# Patient Record
Sex: FEMALE | Race: BLACK OR AFRICAN AMERICAN | Employment: UNEMPLOYED | URBAN - METROPOLITAN AREA
[De-identification: names, ages, dates, MRNs, and addresses within clinical notes are randomized per-mention and may not be internally consistent; named-entity substitution may affect disease eponyms.]

---

## 2017-11-27 ENCOUNTER — HOSPITAL ENCOUNTER (OUTPATIENT)
Dept: LAB | Age: 57
Discharge: HOME | End: 2017-11-27
Payer: MEDICARE

## 2017-11-27 ENCOUNTER — DOCUMENTATION ONLY (OUTPATIENT)
Dept: INTERNAL MEDICINE CLINIC | Age: 57
End: 2017-11-27

## 2017-11-27 ENCOUNTER — OFFICE VISIT (OUTPATIENT)
Dept: INTERNAL MEDICINE CLINIC | Age: 57
End: 2017-11-27

## 2017-11-27 VITALS
HEART RATE: 72 BPM | OXYGEN SATURATION: 97 % | TEMPERATURE: 97.9 F | DIASTOLIC BLOOD PRESSURE: 88 MMHG | RESPIRATION RATE: 20 BRPM | SYSTOLIC BLOOD PRESSURE: 127 MMHG | BODY MASS INDEX: 35.61 KG/M2 | HEIGHT: 63 IN | WEIGHT: 201 LBS

## 2017-11-27 DIAGNOSIS — E11.9 TYPE 2 DIABETES MELLITUS WITHOUT COMPLICATION, WITH LONG-TERM CURRENT USE OF INSULIN (HCC): Primary | ICD-10-CM

## 2017-11-27 DIAGNOSIS — Z12.11 COLON CANCER SCREENING: ICD-10-CM

## 2017-11-27 DIAGNOSIS — I10 ESSENTIAL HYPERTENSION: ICD-10-CM

## 2017-11-27 DIAGNOSIS — Z11.3 SCREEN FOR STD (SEXUALLY TRANSMITTED DISEASE): ICD-10-CM

## 2017-11-27 DIAGNOSIS — E78.5 HYPERLIPIDEMIA, UNSPECIFIED HYPERLIPIDEMIA TYPE: ICD-10-CM

## 2017-11-27 DIAGNOSIS — E11.9 TYPE 2 DIABETES MELLITUS WITHOUT COMPLICATION, WITH LONG-TERM CURRENT USE OF INSULIN (HCC): ICD-10-CM

## 2017-11-27 DIAGNOSIS — Z79.4 TYPE 2 DIABETES MELLITUS WITHOUT COMPLICATION, WITH LONG-TERM CURRENT USE OF INSULIN (HCC): Primary | ICD-10-CM

## 2017-11-27 DIAGNOSIS — Z79.4 TYPE 2 DIABETES MELLITUS WITHOUT COMPLICATION, WITH LONG-TERM CURRENT USE OF INSULIN (HCC): ICD-10-CM

## 2017-11-27 LAB
ALBUMIN SERPL-MCNC: 4 G/DL (ref 3.4–5)
ALBUMIN/GLOB SERPL: 0.9 {RATIO} (ref 0.8–1.7)
ALP SERPL-CCNC: 135 U/L (ref 45–117)
ALT SERPL-CCNC: 28 U/L (ref 13–56)
ANION GAP SERPL CALC-SCNC: 11 MMOL/L (ref 3–18)
AST SERPL-CCNC: 18 U/L (ref 15–37)
BASOPHILS # BLD: 0 K/UL (ref 0–0.06)
BASOPHILS NFR BLD: 0 % (ref 0–2)
BILIRUB SERPL-MCNC: 0.7 MG/DL (ref 0.2–1)
BUN SERPL-MCNC: 20 MG/DL (ref 7–18)
BUN/CREAT SERPL: 26 (ref 12–20)
CALCIUM SERPL-MCNC: 9.4 MG/DL (ref 8.5–10.1)
CHLORIDE SERPL-SCNC: 104 MMOL/L (ref 100–108)
CO2 SERPL-SCNC: 23 MMOL/L (ref 21–32)
CREAT SERPL-MCNC: 0.78 MG/DL (ref 0.6–1.3)
DIFFERENTIAL METHOD BLD: ABNORMAL
EOSINOPHIL # BLD: 0.1 K/UL (ref 0–0.4)
EOSINOPHIL NFR BLD: 1 % (ref 0–5)
ERYTHROCYTE [DISTWIDTH] IN BLOOD BY AUTOMATED COUNT: 15.2 % (ref 11.6–14.5)
EST. AVERAGE GLUCOSE BLD GHB EST-MCNC: 151 MG/DL
GLOBULIN SER CALC-MCNC: 4.3 G/DL (ref 2–4)
GLUCOSE SERPL-MCNC: 94 MG/DL (ref 74–99)
HBA1C MFR BLD: 6.9 % (ref 4.2–5.6)
HCT VFR BLD AUTO: 48.9 % (ref 35–45)
HGB BLD-MCNC: 16.2 G/DL (ref 12–16)
LYMPHOCYTES # BLD: 4 K/UL (ref 0.9–3.6)
LYMPHOCYTES NFR BLD: 48 % (ref 21–52)
MCH RBC QN AUTO: 28.9 PG (ref 24–34)
MCHC RBC AUTO-ENTMCNC: 33.1 G/DL (ref 31–37)
MCV RBC AUTO: 87.2 FL (ref 74–97)
MONOCYTES # BLD: 0.6 K/UL (ref 0.05–1.2)
MONOCYTES NFR BLD: 7 % (ref 3–10)
NEUTS SEG # BLD: 3.7 K/UL (ref 1.8–8)
NEUTS SEG NFR BLD: 44 % (ref 40–73)
PLATELET # BLD AUTO: 468 K/UL (ref 135–420)
PMV BLD AUTO: 11.2 FL (ref 9.2–11.8)
POTASSIUM SERPL-SCNC: 4.2 MMOL/L (ref 3.5–5.5)
PROT SERPL-MCNC: 8.3 G/DL (ref 6.4–8.2)
RBC # BLD AUTO: 5.61 M/UL (ref 4.2–5.3)
RPR SER QL: NONREACTIVE
SODIUM SERPL-SCNC: 138 MMOL/L (ref 136–145)
TSH SERPL DL<=0.05 MIU/L-ACNC: 0.42 UIU/ML (ref 0.36–3.74)
WBC # BLD AUTO: 8.4 K/UL (ref 4.6–13.2)

## 2017-11-27 PROCEDURE — 87389 HIV-1 AG W/HIV-1&-2 AB AG IA: CPT | Performed by: FAMILY MEDICINE

## 2017-11-27 PROCEDURE — 84443 ASSAY THYROID STIM HORMONE: CPT | Performed by: FAMILY MEDICINE

## 2017-11-27 PROCEDURE — 85025 COMPLETE CBC W/AUTO DIFF WBC: CPT | Performed by: FAMILY MEDICINE

## 2017-11-27 PROCEDURE — 36415 COLL VENOUS BLD VENIPUNCTURE: CPT | Performed by: FAMILY MEDICINE

## 2017-11-27 PROCEDURE — 83721 ASSAY OF BLOOD LIPOPROTEIN: CPT | Performed by: FAMILY MEDICINE

## 2017-11-27 PROCEDURE — 87491 CHLMYD TRACH DNA AMP PROBE: CPT | Performed by: FAMILY MEDICINE

## 2017-11-27 PROCEDURE — 83036 HEMOGLOBIN GLYCOSYLATED A1C: CPT | Performed by: FAMILY MEDICINE

## 2017-11-27 PROCEDURE — 86592 SYPHILIS TEST NON-TREP QUAL: CPT | Performed by: FAMILY MEDICINE

## 2017-11-27 PROCEDURE — 80053 COMPREHEN METABOLIC PANEL: CPT | Performed by: FAMILY MEDICINE

## 2017-11-27 RX ORDER — HYDROCHLOROTHIAZIDE 25 MG/1
25 TABLET ORAL DAILY
COMMUNITY
End: 2017-11-27 | Stop reason: SDUPTHER

## 2017-11-27 RX ORDER — INSULIN GLARGINE 100 [IU]/ML
20 INJECTION, SOLUTION SUBCUTANEOUS DAILY
Qty: 1 PEN | Refills: 3 | Status: SHIPPED | OUTPATIENT
Start: 2017-11-27 | End: 2017-11-27 | Stop reason: SDUPTHER

## 2017-11-27 RX ORDER — AMLODIPINE BESYLATE 5 MG/1
5 TABLET ORAL DAILY
Qty: 90 TAB | Refills: 1 | Status: SHIPPED | OUTPATIENT
Start: 2017-11-27 | End: 2017-11-27 | Stop reason: SDUPTHER

## 2017-11-27 RX ORDER — ATORVASTATIN CALCIUM 40 MG/1
40 TABLET, FILM COATED ORAL DAILY
Qty: 90 TAB | Refills: 1 | Status: SHIPPED | OUTPATIENT
Start: 2017-11-27 | End: 2017-11-27 | Stop reason: SDUPTHER

## 2017-11-27 RX ORDER — HYDROCHLOROTHIAZIDE 25 MG/1
25 TABLET ORAL DAILY
Qty: 90 TAB | Refills: 1 | Status: SHIPPED | OUTPATIENT
Start: 2017-11-27 | End: 2017-11-27 | Stop reason: SDUPTHER

## 2017-11-27 RX ORDER — INSULIN GLARGINE 100 [IU]/ML
20 INJECTION, SOLUTION SUBCUTANEOUS DAILY
Qty: 5 PEN | Refills: 3 | Status: SHIPPED | OUTPATIENT
Start: 2017-11-27 | End: 2018-06-18 | Stop reason: SDUPTHER

## 2017-11-27 RX ORDER — ATORVASTATIN CALCIUM 20 MG/1
TABLET, FILM COATED ORAL DAILY
COMMUNITY
End: 2017-11-27 | Stop reason: SDUPTHER

## 2017-11-27 RX ORDER — LANCING DEVICE
EACH MISCELLANEOUS
Qty: 1 EACH | Refills: 0 | Status: SHIPPED | OUTPATIENT
Start: 2017-11-27

## 2017-11-27 RX ORDER — AMLODIPINE BESYLATE 5 MG/1
5 TABLET ORAL DAILY
COMMUNITY
End: 2017-11-27 | Stop reason: SDUPTHER

## 2017-11-27 RX ORDER — AMLODIPINE BESYLATE 5 MG/1
5 TABLET ORAL DAILY
Qty: 90 TAB | Refills: 1 | Status: SHIPPED | OUTPATIENT
Start: 2017-11-27 | End: 2018-06-18 | Stop reason: SDUPTHER

## 2017-11-27 RX ORDER — ATORVASTATIN CALCIUM 40 MG/1
40 TABLET, FILM COATED ORAL DAILY
Qty: 90 TAB | Refills: 1 | Status: SHIPPED | OUTPATIENT
Start: 2017-11-27 | End: 2018-06-18 | Stop reason: SDUPTHER

## 2017-11-27 RX ORDER — HYDROCHLOROTHIAZIDE 25 MG/1
25 TABLET ORAL DAILY
Qty: 90 TAB | Refills: 1 | Status: SHIPPED | OUTPATIENT
Start: 2017-11-27 | End: 2018-06-18 | Stop reason: SDUPTHER

## 2017-11-27 RX ORDER — ASPIRIN 325 MG
325 TABLET ORAL DAILY
Qty: 90 TAB | Refills: 3 | Status: SHIPPED | OUTPATIENT
Start: 2017-11-27 | End: 2017-12-21

## 2017-11-27 RX ORDER — ASPIRIN 325 MG
325 TABLET ORAL DAILY
COMMUNITY
End: 2017-11-27 | Stop reason: SDUPTHER

## 2017-11-27 RX ORDER — LANCING DEVICE
EACH MISCELLANEOUS
Qty: 1 EACH | Refills: 0 | Status: SHIPPED | OUTPATIENT
Start: 2017-11-27 | End: 2017-11-27 | Stop reason: SDUPTHER

## 2017-11-27 RX ORDER — ASPIRIN 325 MG
325 TABLET ORAL DAILY
Qty: 90 TAB | Refills: 3 | Status: SHIPPED | OUTPATIENT
Start: 2017-11-27 | End: 2017-11-27 | Stop reason: SDUPTHER

## 2017-11-27 NOTE — PROGRESS NOTES
Pt was seen in office today. Pt states she forgot to mention to Dr Betzaida Boston that she wants a Colonoscopy. I advised pt that I would send her request to provider.  Pt verbalized understanding and had no further questions

## 2017-11-27 NOTE — TELEPHONE ENCOUNTER
Patient would like prescriptions ordered on 11/27/17 transferred to [facility] on [address] [facility].

## 2017-11-27 NOTE — TELEPHONE ENCOUNTER
The pharmacist with [address] at on [address] [facility] called verified with two Identifiers he stated the way the insulin pen is written wont last patient for 30 days he stated 1 box would last her a 30 day supply. Verbal order to dispense 1 box with 5 pens for patient. Advised that patient really wants to pick it up at the [address] [place] [address] pharmacy. He said she can go there and have them fill it for her since they are all connected without us sending it all again up to us how we do it. Advised would take care of it.

## 2017-11-27 NOTE — PROGRESS NOTES
FAMILY MEDICINE CLINIC NOTE    S: The presents for establishment of care. The patient has a history of DM2, HTN, HLD and COPD. She is due for a colonoscopy. She is taking insulin 20 units daily for her diabetes, needs a refill of this and test strips    She would like an STD screen    Denies any recent angina, dyspnea, edema, palpitations, temperature intolerance or malaise. No current outpatient prescriptions on file prior to visit. No current facility-administered medications on file prior to visit. Past Medical History:   Diagnosis Date    Arthritis     rheumatoid    Diabetes ([facility]     Emphysema lung ([facility]     Hypertension        Social History     Social History    Marital status: LEGALLY      Spouse name: N/A    Number of children: N/A    Years of education: N/A     Occupational History    Not on file. Social History Main Topics    Smoking status: Current Every Day Smoker    Smokeless tobacco: Never Used    Alcohol use Yes    Drug use: No    Sexual activity: Yes     Partners: Male     Other Topics Concern    Not on file     Social History Narrative    No narrative on file       No family history on file. O:  Visit Vitals    /88 (BP 1 Location: Left arm, BP Patient Position: Sitting)    Pulse 72    Temp 97.9 °F (36.6 °C) (Oral)    Resp 20    Ht 5' 3\" (1.6 m)    Wt 201 lb (91.2 kg)    SpO2 97%    BMI 35.61 kg/m2     NAD, comfortable  No thyromegaly  RRR, no murmurs  CTABL, no wheezing/ronchi/rales  abd soft ND NT normoactive bs  No edema    56 y.o. female      ICD-10-CM ICD-9-CM    1.  Type 2 diabetes mellitus without complication, with long-term current use of insulin ([facility]) E11.9 250.00 TSH 3RD GENERATION    U98.1 Z42.36 METABOLIC PANEL, COMPREHENSIVE      HEMOGLOBIN A1C WITH EAG      DISCONTINUED: insulin glargine (LANTUS,BASAGLAR) 100 unit/mL (3 mL) inpn      DISCONTINUED: glucose blood VI test strips (FREESTYLE TEST) strip      DISCONTINUED: Lancing Device (AUTO-LANCET MINI) misc      DISCONTINUED: amLODIPine (NORVASC) 5 mg tablet      DISCONTINUED: aspirin (ASPIRIN) 325 mg tablet      DISCONTINUED: hydroCHLOROthiazide (HYDRODIURIL) 25 mg tablet   2. Essential hypertension I10 401.9 TSH 3RD GENERATION      CBC WITH AUTOMATED DIFF      METABOLIC PANEL, COMPREHENSIVE   3. Hyperlipidemia, unspecified hyperlipidemia type E78.5 272.4 TSH 3RD GENERATION      LDL, DIRECT      METABOLIC PANEL, COMPREHENSIVE      DISCONTINUED: atorvastatin (LIPITOR) 40 mg tablet      DISCONTINUED: aspirin (ASPIRIN) 325 mg tablet   4. Screen for STD (sexually transmitted disease) Z11.3 V74.5 HIV 1/2 AG/AB, 4TH GENERATION,W RFLX CONFIRM      RPR      CHLAMYDIA/GC PCR   5.  Colon cancer screening Z12.11 V76.51 REFERRAL TO GASTROENTEROLOGY

## 2017-11-27 NOTE — MR AVS SNAPSHOT
Visit Information Date & Time Provider Department Dept. Phone Encounter #  
 11/27/2017 12:00 PM Sury [facility] [phone] [id number] Upcoming Health Maintenance Date Due Hepatitis C Screening 1960 DTaP/Tdap/Td series (1 - Tdap) 7/2/1983 PAP AKA CERVICAL CYTOLOGY 7/2/1983 BREAST CANCER SCRN MAMMOGRAM 7/2/2012 FOBT Q 1 YEAR AGE 50-75 7/2/2012 Influenza Age 5 to Adult 8/1/2017 Allergies as of 11/27/2017  Review Complete On: 11/27/2017 By: Sury Mojica MD  
  
 Severity Noted Reaction Type Reactions Ace Inhibitors  11/27/2017    Other (comments) Throat closure Current Immunizations  Never Reviewed No immunizations on file. Not reviewed this visit You Were Diagnosed With   
  
 Codes Comments Type 2 diabetes mellitus without complication, with long-term current use of insulin (HCC)    -  Primary ICD-10-CM: E11.9, Z79.4 ICD-9-CM: 250.00, V58.67 Essential hypertension     ICD-10-CM: I10 
ICD-9-CM: 401.9 Hyperlipidemia, unspecified hyperlipidemia type     ICD-10-CM: E78.5 ICD-9-CM: 272.4 Screen for STD (sexually transmitted disease)     ICD-10-CM: Z11.3 ICD-9-CM: V74.5 Vitals BP Pulse Temp Resp Height(growth percentile) Weight(growth percentile) 127/88 (BP 1 Location: Left arm, BP Patient Position: Sitting) 72 97.9 °F (36.6 °C) (Oral) 20 5' 3\" (1.6 m) 201 lb (91.2 kg) SpO2 BMI OB Status Smoking Status 97% 35.61 kg/m2 Hysterectomy Current Every Day Smoker Vitals History BMI and BSA Data Body Mass Index Body Surface Area  
 35.61 kg/m 2 2.01 m 2 Preferred Pharmacy Pharmacy Name Phone [facility] [address]  [facility] [phone] Your Updated Medication List  
  
   
This list is accurate as of: 11/27/17 12:26 PM.  Always use your most recent med list. amLODIPine 5 mg tablet Commonly known as:  Hernesto Chafe Take 1 Tab by mouth daily. aspirin 325 mg tablet Commonly known as:  aspirin Take 1 Tab by mouth daily. atorvastatin 40 mg tablet Commonly known as:  LIPITOR Take 1 Tab by mouth daily. glucose blood VI test strips strip Commonly known as:  FREESTYLE TEST Use to test blood sugar 1-2 times per day  
  
 hydroCHLOROthiazide 25 mg tablet Commonly known as:  HYDRODIURIL Take 1 Tab by mouth daily. insulin glargine 100 unit/mL (3 mL) Inpn Commonly known as:  LANTUS,BASAGLAR  
20 Units by SubCUTAneous route daily. Lancing Device Misc Commonly known as:  AUTO-LANCET MINI Use to hold lancets Prescriptions Sent to Pharmacy Refills  
 insulin glargine (LANTUS,BASAGLAR) 100 unit/mL (3 mL) inpn 3 Si Units by SubCUTAneous route daily. Class: Normal  
 Pharmacy: [address] Ph #: [phone] Route: SubCUTAneous  
 glucose blood VI test strips (FREESTYLE TEST) strip 3 Sig: Use to test blood sugar 1-2 times per day Class: Normal  
 Pharmacy: [address] Ph #: [phone] Lancing Device (AUTO-LANCET MINI) misc 0 Sig: Use to hold lancets Class: Normal  
 Pharmacy: [address] [place] [address] Ph #: [phone]  
 atorvastatin (LIPITOR) 40 mg tablet 1 Sig: Take 1 Tab by mouth daily. Class: Normal  
 Pharmacy: [address] Ph #: [phone] Route: Oral  
 amLODIPine (NORVASC) 5 mg tablet 1 Sig: Take 1 Tab by mouth daily. Class: Normal  
 Pharmacy: [address] Ph #: [phone] Route: Oral  
 aspirin (ASPIRIN) 325 mg tablet 3 Sig: Take 1 Tab by mouth daily.   
 Class: Normal  
 Pharmacy: [facility] Ph #: [phone] Route: Oral  
 hydroCHLOROthiazide (HYDRODIURIL) 25 mg tablet 1 Sig: Take 1 Tab by mouth daily. Class: Normal  
 Pharmacy: [facility] Ph #: [phone] Route: Oral  
  
To-Do List   
 11/27/2017 Lab:  CBC WITH AUTOMATED DIFF   
  
 11/27/2017 Lab:  CHLAMYDIA/GC PCR   
  
 11/27/2017 Lab:  HEMOGLOBIN A1C WITH EAG   
  
 11/27/2017 Lab:  HIV 1/2 AG/AB, 4TH GENERATION,W RFLX CONFIRM   
  
 11/27/2017 Lab:  LDL, DIRECT   
  
 11/27/2017 Lab:  METABOLIC PANEL, COMPREHENSIVE   
  
 11/27/2017 Lab:  RPR   
  
 11/27/2017 Lab:  TSH 3RD GENERATION Introducing [facility] SERVICES! Adeladie Low introduces [employer] patient portal. Now you can access parts of your medical record, email your doctor's office, and request medication refills online. 1. In your internet browser, go to [email or web address] 2. Click on the First Time User? Click Here link in the Sign In box. You will see the New Member Sign Up page. 3. Enter your ShopEat Access Code exactly as it appears below. You will not need to use this code after youve completed the sign-up process. If you do not sign up before the expiration date, you must request a new code. · [employer]Eat Access Code: [email or web address] Expires: 2/25/2018 12:07 PM 
 
4. Enter the last four digits of your Social Security Number (xxxx) and Date of Birth (mm/dd/yyyy) as indicated and click Submit. You will be taken to the next sign-up page. 5. Create a 24Symbolst ID. This will be your ShopEat login ID and cannot be changed, so think of one that is secure and easy to remember. 6. Create a ShopEat password. You can change your password at any time. 7. Enter your Password Reset Question and Answer. This can be used at a later time if you forget your password. 8. Enter your e-mail address. You will receive e-mail notification when new information is available in [address] 9. Click Sign Up. You can now view and download portions of your medical record. 10. Click the Download Summary menu link to download a portable copy of your medical information. If you have questions, please visit the Frequently Asked Questions section of the [employer] website. Remember, [employer] is NOT to be used for urgent needs. For medical emergencies, dial 911. Now available from your iPhone and Android! Please provide this summary of care documentation to your next provider. Your primary care clinician is listed as NOT ON FILE. If you have any questions after today's visit, please call [phone].

## 2017-11-28 LAB
HIV 1+2 AB+HIV1 P24 AG SERPL QL IA: NONREACTIVE
HIV12 RESULT COMMENT, HHIVC: NORMAL
LDLC SERPL DIRECT ASSAY-MCNC: 68 MG/DL (ref 0–99)

## 2017-11-29 LAB
C TRACH RRNA SPEC QL NAA+PROBE: NEGATIVE
N GONORRHOEA RRNA SPEC QL NAA+PROBE: NEGATIVE
SPECIMEN SOURCE: NORMAL

## 2017-12-06 ENCOUNTER — TELEPHONE (OUTPATIENT)
Dept: INTERNAL MEDICINE CLINIC | Age: 57
End: 2017-12-06

## 2017-12-06 NOTE — LETTER
12/21/2017 Ms. Manuela Ruiz [id number] [facility] [address] [id number] Re: Labs Dear Aditya Post: 
 
I hope this letter finds you well. I am a Licensed Practical Nurse with [employer]. I have attempted to contact you by phone, but was unsuccessful. Your good health is important to us, that is why we are sending you this friendly letter. Please contact our office regarding labs drawn/resulted on November 27, 2017. As always, our goal is to be your partner in life-long wellness. We appreciate the opportunity to serve you! Sincerely, Violette Dupree.[email or web address]

## 2017-12-13 NOTE — TELEPHONE ENCOUNTER
Please let pt know that labs were normal except:    1) BUN, \"kidney function,\" little up at 20. Probably due to dehydration from fasting. Keep hydrated with water. Will monitor. 2) HgA1C is good at 6.9.    3) alk phos up at 135 and total protein up. Is she having any RUQ pain, yellowing of eyes or skin, n/v/?    4) H/H up at 16.2/48. 9: probably up from dehydration and also smoking. Increase water and stop smoking. 5) Negative for GC/chlamydia, HIV, syphilis.

## 2017-12-19 NOTE — TELEPHONE ENCOUNTER
Attempted to contact pt at  number, no answer. Lvm for pt to return call to office at [phone]. Will continue to try to contact pt.

## 2017-12-21 ENCOUNTER — TELEPHONE (OUTPATIENT)
Dept: INTERNAL MEDICINE CLINIC | Age: 57
End: 2017-12-21

## 2017-12-21 ENCOUNTER — OFFICE VISIT (OUTPATIENT)
Dept: INTERNAL MEDICINE CLINIC | Age: 57
End: 2017-12-21

## 2017-12-21 VITALS
OXYGEN SATURATION: 100 % | BODY MASS INDEX: 35.93 KG/M2 | HEIGHT: 63 IN | WEIGHT: 202.8 LBS | TEMPERATURE: 97.3 F | SYSTOLIC BLOOD PRESSURE: 107 MMHG | DIASTOLIC BLOOD PRESSURE: 79 MMHG | HEART RATE: 67 BPM | RESPIRATION RATE: 18 BRPM

## 2017-12-21 DIAGNOSIS — Z11.59 NEED FOR HEPATITIS C SCREENING TEST: ICD-10-CM

## 2017-12-21 DIAGNOSIS — R09.82 PND (POST-NASAL DRIP): ICD-10-CM

## 2017-12-21 DIAGNOSIS — E11.9 TYPE 2 DIABETES MELLITUS WITHOUT COMPLICATION, WITH LONG-TERM CURRENT USE OF INSULIN (HCC): ICD-10-CM

## 2017-12-21 DIAGNOSIS — Z12.11 COLON CANCER SCREENING: ICD-10-CM

## 2017-12-21 DIAGNOSIS — G45.9 TRANSIENT CEREBRAL ISCHEMIA, UNSPECIFIED TYPE: ICD-10-CM

## 2017-12-21 DIAGNOSIS — H10.9 BACTERIAL CONJUNCTIVITIS: ICD-10-CM

## 2017-12-21 DIAGNOSIS — R05.9 COUGH: Primary | ICD-10-CM

## 2017-12-21 DIAGNOSIS — Z79.4 TYPE 2 DIABETES MELLITUS WITHOUT COMPLICATION, WITH LONG-TERM CURRENT USE OF INSULIN (HCC): ICD-10-CM

## 2017-12-21 RX ORDER — ALBUTEROL SULFATE 90 UG/1
2 AEROSOL, METERED RESPIRATORY (INHALATION)
Qty: 1 INHALER | Refills: 3 | Status: SHIPPED | OUTPATIENT
Start: 2017-12-21

## 2017-12-21 RX ORDER — ERYTHROMYCIN 5 MG/G
OINTMENT OPHTHALMIC
Qty: 1 G | Refills: 0 | Status: SHIPPED | OUTPATIENT
Start: 2017-12-21 | End: 2019-02-18 | Stop reason: ALTCHOICE

## 2017-12-21 RX ORDER — CLOPIDOGREL BISULFATE 75 MG/1
75 TABLET ORAL DAILY
Qty: 90 TAB | Refills: 1 | Status: SHIPPED | OUTPATIENT
Start: 2017-12-21 | End: 2018-06-18 | Stop reason: SDUPTHER

## 2017-12-21 RX ORDER — BUPROPION HYDROCHLORIDE 150 MG/1
150 TABLET, EXTENDED RELEASE ORAL
COMMUNITY
Start: 2016-08-30

## 2017-12-21 RX ORDER — NYSTATIN 100000 [USP'U]/ML
SUSPENSION ORAL
COMMUNITY
Start: 2017-11-13

## 2017-12-21 RX ORDER — ESOMEPRAZOLE MAGNESIUM 40 MG/1
40 CAPSULE, DELAYED RELEASE ORAL
COMMUNITY
Start: 2016-07-25

## 2017-12-21 RX ORDER — PSEUDOEPHEDRINE HYDROCHLORIDE 60 MG/1
60 TABLET ORAL
Qty: 40 TAB | Refills: 0 | Status: SHIPPED | OUTPATIENT
Start: 2017-12-21

## 2017-12-21 RX ORDER — ALBUTEROL SULFATE 90 UG/1
AEROSOL, METERED RESPIRATORY (INHALATION)
COMMUNITY
Start: 2016-01-06

## 2017-12-21 NOTE — TELEPHONE ENCOUNTER
2 pt. Identifiers confirmed. Pt. Notified of below. She denies any of below s/s. No other questions/concerns at this time. Brent Brunson MD   Internal Medicine        Please let pt know that labs were normal except:     1) BUN, \"kidney function,\" little up at 20. Probably due to dehydration from fasting. Keep hydrated with water. Will monitor.    2) HgA1C is good at 6.9.     3) alk phos up at 135 and total protein up. Is she having any RUQ pain, yellowing of eyes or skin, n/v/?     4) H/H up at 16.2/48. 9: probably up from dehydration and also smoking. Increase water and stop smoking.      5) Negative for GC/chlamydia, HIV, syphilis.         Electronically signed by Brent Brunson MD at 12/13/17 0074

## 2017-12-21 NOTE — PROGRESS NOTES
Magui Keller presents today for   Chief Complaint   Patient presents with   Syliva Kimball Eye     right    Hypertension     1 month f/u    Diabetes     1 month f/u       Erna Shelley preferred language for health care discussion is english/other. Is someone accompanying this pt? no    Is the patient using any DME equipment during OV? no    Depression Screening:  PHQ over the last two weeks 12/21/2017 11/27/2017   Little interest or pleasure in doing things Several days Not at all   Feeling down, depressed or hopeless Several days Not at all   Total Score PHQ 2 2 0       Learning Assessment:  Learning Assessment 11/27/2017   PRIMARY LEARNER Patient   HIGHEST LEVEL OF EDUCATION - PRIMARY LEARNER  SOME COLLEGE   PRIMARY LANGUAGE ENGLISH   LEARNER PREFERENCE PRIMARY DEMONSTRATION   ANSWERED BY patient   RELATIONSHIP SELF       Abuse Screening:  No flowsheet data found. Fall Risk  No flowsheet data found. Health Maintenance reviewed and discussed per provider. Yes    Magui Keller is due for MULTIPLE, see hm due (pt. To schedule eye exam). Please order/place referral if appropriate. Advance Directive:  1. Do you have an advance directive in place? Patient Reply: no    2. If not, would you like material regarding how to put one in place? Patient Reply: no    Coordination of Care:  1. Have you been to the ER, urgent care clinic since your last visit? Hospitalized since your last visit? [facility] for TIA  12/11/17    2. Have you seen or consulted any other health care providers outside of the [address] [place] [address] since your last visit? Include any pap smears or colon screening.  no

## 2017-12-21 NOTE — MR AVS SNAPSHOT
Visit Information Date & Time Provider Department Dept. Phone Encounter #  
 12/21/2017  8:30 AM Meghana Reese [place] [facility] [phone] [id number] Upcoming Health Maintenance Date Due Hepatitis C Screening 1960 LIPID PANEL Q1 1962 FOOT EXAM Q1 7/2/1972 MICROALBUMIN Q1 7/2/1972 EYE EXAM RETINAL OR DILATED Q1 7/2/1972 PAP AKA CERVICAL CYTOLOGY 7/2/1983 FOBT Q 1 YEAR AGE 50-75 7/2/2012 Pneumococcal 19-64 Medium Risk (1 of 1 - PPSV23) 12/28/2017* DTaP/Tdap/Td series (1 - Tdap) 12/28/2017* HEMOGLOBIN A1C Q6M 5/27/2018 *Topic was postponed. The date shown is not the original due date. Allergies as of 12/21/2017  Review Complete On: 12/21/2017 By: Seun De Leon LPN Severity Noted Reaction Type Reactions Penicillin V High 10/01/2013    Shortness of Breath Lisinopril Medium   Swelling Ace Inhibitors  11/27/2017    Other (comments) Throat closure Current Immunizations  Never Reviewed Name Date Influenza Vaccine 10/20/2011 12:00 AM  
  
 Not reviewed this visit You Were Diagnosed With   
  
 Codes Comments Cough    -  Primary ICD-10-CM: H39 ICD-9-CM: 937. 2 Type 2 diabetes mellitus without complication, with long-term current use of insulin (HCC)     ICD-10-CM: E11.9, Z79.4 ICD-9-CM: 250.00, V58.67 Transient cerebral ischemia, unspecified type     ICD-10-CM: G45.9 ICD-9-CM: 435.9 PND (post-nasal drip)     ICD-10-CM: R09.82 ICD-9-CM: 784.91 Need for hepatitis C screening test     ICD-10-CM: Z11.59 
ICD-9-CM: V73.89 Bacterial conjunctivitis     ICD-10-CM: H10.9 ICD-9-CM: 372.39, 041.9 Colon cancer screening     ICD-10-CM: Z12.11 ICD-9-CM: V76.51 Vitals BP Pulse Temp Resp Height(growth percentile) Weight(growth percentile) 107/79 (BP 1 Location: Right arm, BP Patient Position: Sitting) 67 97.3 °F (36.3 °C) (Oral) 18 5' 3\" (1.6 m) 202 lb 12.8 oz (92 kg) SpO2 BMI OB Status Smoking Status 100% 35.92 kg/m2 Hysterectomy Current Every Day Smoker Vitals History BMI and BSA Data Body Mass Index Body Surface Area 35.92 kg/m 2 2.02 m 2 Preferred Pharmacy Pharmacy Name Phone [facility], [address] [place] [address] Drive [phone] Your Updated Medication List  
  
   
This list is accurate as of: 12/21/17  9:36 AM.  Always use your most recent med list. amLODIPine 5 mg tablet Commonly known as:  Merry Myra Take 1 Tab by mouth daily. atorvastatin 40 mg tablet Commonly known as:  LIPITOR Take 1 Tab by mouth daily. clopidogrel 75 mg Tab Commonly known as:  PLAVIX Take 1 Tab by mouth daily. erythromycin ophthalmic ointment Commonly known as:  ILOTYCIN Place a small ribbon of ointment in the right eye four times daily for 7 days  
  
 esomeprazole 40 mg capsule Commonly known as:  NEXIUM  
40 mg.  
  
 glucose blood VI test strips strip Commonly known as:  FREESTYLE TEST Use to test blood sugar 1-2 times per day  
  
 hydroCHLOROthiazide 25 mg tablet Commonly known as:  HYDRODIURIL Take 1 Tab by mouth daily. inhalational spacing device Use every time you use your inhaler  
  
 insulin glargine 100 unit/mL (3 mL) Inpn Commonly known as:  LANTUS,BASAGLAR  
20 Units by SubCUTAneous route daily. Lancing Device Misc Commonly known as:  AUTO-LANCET MINI Use to hold lancets  
  
 nystatin 100,000 unit/mL suspension Commonly known as:  MYCOSTATIN  
  
 * PROAIR HFA 90 mcg/actuation inhaler Generic drug:  albuterol PROAIR  (90 Base) MCG/ACT AERS  
  
 * albuterol 90 mcg/actuation inhaler Commonly known as:  PROVENTIL HFA, VENTOLIN HFA, PROAIR HFA Take 2 Puffs by inhalation every four (4) hours as needed for Wheezing. pseudoephedrine 60 mg tablet Commonly known as:  SUDAFED  
 Take 1 Tab by mouth every six (6) hours as needed for Congestion. WELLBUTRIN  mg SR tablet Generic drug:  buPROPion SR  
150 mg.  
  
 * Notice: This list has 2 medication(s) that are the same as other medications prescribed for you. Read the directions carefully, and ask your doctor or other care provider to review them with you. Prescriptions Sent to Pharmacy Refills  
 erythromycin (ILOTYCIN) ophthalmic ointment 0 Sig: Place a small ribbon of ointment in the right eye four times daily for 7 days Class: Normal  
 Pharmacy: [id number] Ph #: [phone]  
 albuterol (PROVENTIL HFA, VENTOLIN HFA, PROAIR HFA) 90 mcg/actuation inhaler 3 Sig: Take 2 Puffs by inhalation every four (4) hours as needed for Wheezing. Class: Normal  
 Pharmacy: [facility] [place] [address] Ph #: [phone] Route: Inhalation  
 inhalational spacing device 0 Sig: Use every time you use your inhaler Class: Normal  
 [facility]: [facility] [address] [place] [address] Ph #: [phone]  
 pseudoephedrine (SUDAFED) 60 mg tablet 0 Sig: Take 1 Tab by mouth every six (6) hours as needed for Congestion. Class: Normal  
 Pharmacy: [facility] [address] [place] [address] Ph #: [phone] Route: Oral  
 clopidogrel (PLAVIX) 75 mg tab 1 Sig: Take 1 Tab by mouth daily. Class: Normal  
 Pharmacy: [facility] [address] [place] [address] Ph #: [phone] Route: Oral  
  
To-Do List   
 12/21/2017 Lab:  HEPATITIS C AB   
  
 12/21/2017 Lab:  MICROALBUMIN, UR, RAND W/ MICROALBUMIN/CREA RATIO   
  
 12/21/2017 Imaging:  XR CHEST PA LAT   
  
 01/20/2018 Lab:  OCCULT BLOOD, IMMUNOASSAY (FIT) Introducing [facility] SERVICES!    
 Neville Carrion introduces [employer] patient portal. Now you can access parts of your medical record, email your doctor's office, and request medication refills online. 1. In your internet browser, go to [email or web address] 2. Click on the First Time User? Click Here link in the Sign In box. You will see the New Member Sign Up page. 3. Enter your Go[employer] Access Code exactly as it appears below. You will not need to use this code after youve completed the sign-up process. If you do not sign up before the expiration date, you must request a new code. · [employer] Access Code: [email or web address] Expires: 2/25/2018 12:07 PM 
 
4. Enter the last four digits of your Social Security Number (xxxx) and Date of Birth (mm/dd/yyyy) as indicated and click Submit. You will be taken to the next sign-up page. 5. Create a Goomzee ID. This will be your [employer] login ID and cannot be changed, so think of one that is secure and easy to remember. 6. Create a Go[employer]ee password. You can change your password at any time. 7. Enter your Password Reset Question and Answer. This can be used at a later time if you forget your password. 8. Enter your e-mail address. You will receive e-mail notification when new information is available in [address] 9. Click Sign Up. You can now view and download portions of your medical record. 10. Click the Download Summary menu link to download a portable copy of your medical information. If you have questions, please visit the Frequently Asked Questions section of the [employer] website. Remember, [employer] is NOT to be used for urgent needs. For medical emergencies, dial 911. Now available from your iPhone and Android! Please provide this summary of care documentation to your next provider. Your primary care clinician is listed as Ruth Sauceda. If you have any questions after today's visit, please call [phone].

## 2017-12-21 NOTE — TELEPHONE ENCOUNTER
Attempted to contact pt at  number, no answer. Lvm for pt to return call to office at [phone]. After unsuccessful attempts to contact pt will send letter to address on file advising to contact office.

## 2017-12-21 NOTE — PROGRESS NOTES
FAMILY MEDICINE CLINIC NOTE    S: The patient presents with a complaint of chest congestion, nasal congestion for the last few days. Denies fever. Cough productive of yellowish sputum. She is a smoker, smoking 1 PPD. Utilizing wellbutrin and nicotine patches but still Smoking. She states that she has been in contact with pink eye and feels like it is starting in her right eye    The patient states that she had a recent TIA approx 11 days ago. She went to [facility] but left AMA. She has been taking aspirin 325 mg daily. Current Outpatient Prescriptions:     albuterol (PROAIR HFA) 90 mcg/actuation inhaler, PROAIR  (90 Base) MCG/ACT AERS, Disp: , Rfl:     buPROPion SR (WELLBUTRIN SR) 150 mg SR tablet, 150 mg., Disp: , Rfl:     esomeprazole (NEXIUM) 40 mg capsule, 40 mg., Disp: , Rfl:     nystatin (MYCOSTATIN) 100,000 unit/mL suspension, , Disp: , Rfl:     erythromycin (ILOTYCIN) ophthalmic ointment, Place a small ribbon of ointment in the right eye four times daily for 7 days, Disp: 1 g, Rfl: 0    albuterol (PROVENTIL HFA, VENTOLIN HFA, PROAIR HFA) 90 mcg/actuation inhaler, Take 2 Puffs by inhalation every four (4) hours as needed for Wheezing., Disp: 1 Inhaler, Rfl: 3    inhalational spacing device, Use every time you use your inhaler, Disp: 1 Device, Rfl: 0    pseudoephedrine (SUDAFED) 60 mg tablet, Take 1 Tab by mouth every six (6) hours as needed for Congestion. , Disp: 40 Tab, Rfl: 0    clopidogrel (PLAVIX) 75 mg tab, Take 1 Tab by mouth daily. , Disp: 90 Tab, Rfl: 1    insulin glargine (LANTUS,BASAGLAR) 100 unit/mL (3 mL) inpn, 20 Units by SubCUTAneous route daily. , Disp: 5 Pen, Rfl: 3    glucose blood VI test strips (FREESTYLE TEST) strip, Use to test blood sugar 1-2 times per day, Disp: 100 Strip, Rfl: 3    Lancing Device (AUTO-LANCET MINI) misc, Use to hold lancets, Disp: 1 Each, Rfl: 0    atorvastatin (LIPITOR) 40 mg tablet, Take 1 Tab by mouth daily. , Disp: 90 Tab, Rfl: 1   amLODIPine (NORVASC) 5 mg tablet, Take 1 Tab by mouth daily. , Disp: 90 Tab, Rfl: 1    hydroCHLOROthiazide (HYDRODIURIL) 25 mg tablet, Take 1 Tab by mouth daily. , Disp: 80 Tab, Rfl: 1    Past Medical History:   Diagnosis Date    Arthritis     rheumatoid    [facility] [facility]     Emphysema lung [facility]     Hypertension        Past Surgical History:   Procedure Laterality Date    HX BREAST AUGMENTATION      HX ORTHOPAEDIC         Social History     Social History    Marital status: LEGALLY      Spouse name: N/A    Number of children: N/A    Years of education: N/A     Occupational History    Not on file. Social History Main Topics    Smoking status: Current Every Day Smoker    Smokeless tobacco: Never Used    Alcohol use Yes    Drug use: No    Sexual activity: Yes     Partners: Male     Other Topics Concern    Not on file     Social History Narrative       History reviewed. No pertinent family history. O:  Visit Vitals    /79 (BP 1 Location: Right arm, BP Patient Position: Sitting)    Pulse 67    Temp 97.3 °F (36.3 °C) (Oral)    Resp 18    Ht 5' 3\" (1.6 m)    Wt 202 lb 12.8 oz (92 kg)    SpO2 100%    BMI 35.92 kg/m2     NAD, comfortable  No LAD  RRR, no murmurs  CTABL, no wheezing/ronchi/rales  No facial asymmetry or slurred speech  Motor strength 5/5 bilat UE/LE  Normal gait    56 y.o. female      ICD-10-CM ICD-9-CM    1. Cough R05 786.2 XR CHEST PA LAT      albuterol (PROVENTIL HFA, VENTOLIN HFA, PROAIR HFA) 90 mcg/actuation inhaler      inhalational spacing device   2. Type 2 diabetes mellitus without complication, with long-term current use of insulin (HCC) E11.9 250.00 MICROALBUMIN, UR, RAND W/ MICROALBUMIN/CREA RATIO    Z79.4 V58.67    3. Transient cerebral ischemia, unspecified type G45.9 435.9 clopidogrel (PLAVIX) 75 mg tab  Discontinue aspirin   4. PND (post-nasal drip) R09.82 784.91 pseudoephedrine (SUDAFED) 60 mg tablet   5.  Need for hepatitis C screening test Z11.59 V73.89 HEPATITIS C AB   6. Bacterial conjunctivitis H10.9 372.39 erythromycin (ILOTYCIN) ophthalmic ointment     041.9    7.  Colon cancer screening Z12.11 V76.51 OCCULT BLOOD, IMMUNOASSAY (FIT)

## 2018-03-01 ENCOUNTER — OFFICE VISIT (OUTPATIENT)
Dept: INTERNAL MEDICINE CLINIC | Age: 58
End: 2018-03-01

## 2018-03-01 VITALS
RESPIRATION RATE: 16 BRPM | OXYGEN SATURATION: 98 % | WEIGHT: 202 LBS | BODY MASS INDEX: 35.79 KG/M2 | HEIGHT: 63 IN | HEART RATE: 77 BPM | TEMPERATURE: 98.6 F | SYSTOLIC BLOOD PRESSURE: 113 MMHG | DIASTOLIC BLOOD PRESSURE: 74 MMHG

## 2018-03-01 VITALS
SYSTOLIC BLOOD PRESSURE: 113 MMHG | RESPIRATION RATE: 16 BRPM | TEMPERATURE: 98.6 F | DIASTOLIC BLOOD PRESSURE: 74 MMHG | OXYGEN SATURATION: 98 % | HEIGHT: 63 IN | HEART RATE: 77 BPM | WEIGHT: 203 LBS | BODY MASS INDEX: 35.97 KG/M2

## 2018-03-01 DIAGNOSIS — E11.9 CONTROLLED TYPE 2 DIABETES MELLITUS WITHOUT COMPLICATION, UNSPECIFIED WHETHER LONG TERM INSULIN USE (HCC): ICD-10-CM

## 2018-03-01 DIAGNOSIS — J98.8 CONGESTION OF UPPER AIRWAY: ICD-10-CM

## 2018-03-01 DIAGNOSIS — S13.4XXA WHIPLASH INJURY TO NECK, INITIAL ENCOUNTER: Primary | ICD-10-CM

## 2018-03-01 RX ORDER — TIZANIDINE 4 MG/1
4 TABLET ORAL
Qty: 90 TAB | Refills: 1 | Status: SHIPPED | OUTPATIENT
Start: 2018-03-01 | End: 2018-06-25

## 2018-03-01 RX ORDER — FLUTICASONE PROPIONATE 50 MCG
2 SPRAY, SUSPENSION (ML) NASAL DAILY
Qty: 1 BOTTLE | Refills: 2 | Status: SHIPPED | OUTPATIENT
Start: 2018-03-01

## 2018-03-01 NOTE — PROGRESS NOTES
Patient presents to the clinic for lower back pain that began last night. Patient would also like to discuss right ear fullness.
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
## 2018-03-19 ENCOUNTER — TELEPHONE (OUTPATIENT)
Dept: INTERNAL MEDICINE CLINIC | Age: 58
End: 2018-03-19

## 2018-03-19 NOTE — TELEPHONE ENCOUNTER
Patient has questions regarding Massage Therapy recommended by Provider at last office visit. Patient unsure where she is supposed to go for therapy. No referral listed on chart.

## 2018-03-22 NOTE — TELEPHONE ENCOUNTER
Contacted pt at [facility] number. Two patient Identifiers confirmed. Advised pt per Dr Saray Jc notes. Pt verbalized understanding.

## 2018-03-29 ENCOUNTER — TELEPHONE (OUTPATIENT)
Dept: INTERNAL MEDICINE CLINIC | Age: 58
End: 2018-03-29

## 2018-03-29 DIAGNOSIS — M62.838 NECK MUSCLE SPASM: Primary | ICD-10-CM

## 2018-03-29 NOTE — TELEPHONE ENCOUNTER
Patient states she was supposed to be referred to a Massage Therapist or PT. Needs this order faxed to [phone] attn: Moris Wagoner. I do not see this referral in the patient's chart.

## 2018-05-08 ENCOUNTER — TELEPHONE (OUTPATIENT)
Dept: INTERNAL MEDICINE CLINIC | Age: 58
End: 2018-05-08

## 2018-05-08 NOTE — TELEPHONE ENCOUNTER
Patient due to have biopsy on 14 May 2018. Patient was told to contact PCP to discontinue Plavix and find out why she is on Plavix.

## 2018-05-09 NOTE — TELEPHONE ENCOUNTER
Is this something she is okay to d/c for her biopsy date below? Please advise. Shira Diaz MD   You 19 hours ago (5:25 PM)                 The patient is on plavix because she had a TIA while on 325 mg of aspirin. TIA/CVA while already on aspirin typically results in patient being put on plavix or aggrenox.   (Routing comment)

## 2018-05-12 NOTE — TELEPHONE ENCOUNTER
2 pt. Identifiers confirmed. Pt. Notified of below. She states she has not been taking the plavix for last couple days. No other questions/concerns at this time.      Hayley Keith MD   You 3 days ago                 She can stop plavix 5 days before her biopsy, then after the biopsy she can resume, taking 2 tablets on the first day after her biopsy and then 1 tablet daily after that as usual. (Routing comment)

## 2018-06-18 ENCOUNTER — OFFICE VISIT (OUTPATIENT)
Dept: INTERNAL MEDICINE CLINIC | Age: 58
End: 2018-06-18

## 2018-06-18 ENCOUNTER — HOSPITAL ENCOUNTER (OUTPATIENT)
Dept: LAB | Age: 58
Discharge: HOME | End: 2018-06-18
Payer: MEDICARE

## 2018-06-18 VITALS
BODY MASS INDEX: 35.37 KG/M2 | SYSTOLIC BLOOD PRESSURE: 123 MMHG | WEIGHT: 199.6 LBS | HEIGHT: 63 IN | RESPIRATION RATE: 20 BRPM | HEART RATE: 74 BPM | OXYGEN SATURATION: 99 % | TEMPERATURE: 97.1 F | DIASTOLIC BLOOD PRESSURE: 101 MMHG

## 2018-06-18 DIAGNOSIS — E78.5 HYPERLIPIDEMIA, UNSPECIFIED HYPERLIPIDEMIA TYPE: ICD-10-CM

## 2018-06-18 DIAGNOSIS — E11.9 TYPE 2 DIABETES MELLITUS WITHOUT COMPLICATION, WITH LONG-TERM CURRENT USE OF INSULIN (HCC): ICD-10-CM

## 2018-06-18 DIAGNOSIS — G45.9 TRANSIENT CEREBRAL ISCHEMIA, UNSPECIFIED TYPE: ICD-10-CM

## 2018-06-18 DIAGNOSIS — M75.101 ROTATOR CUFF SYNDROME, RIGHT: Primary | ICD-10-CM

## 2018-06-18 DIAGNOSIS — Z79.4 TYPE 2 DIABETES MELLITUS WITHOUT COMPLICATION, WITH LONG-TERM CURRENT USE OF INSULIN (HCC): ICD-10-CM

## 2018-06-18 LAB
ALBUMIN SERPL-MCNC: 3.7 G/DL (ref 3.4–5)
ALBUMIN/GLOB SERPL: 1.1 {RATIO} (ref 0.8–1.7)
ALP SERPL-CCNC: 131 U/L (ref 45–117)
ALT SERPL-CCNC: 29 U/L (ref 13–56)
ANION GAP SERPL CALC-SCNC: 12 MMOL/L (ref 3–18)
AST SERPL-CCNC: 15 U/L (ref 15–37)
BILIRUB SERPL-MCNC: 0.5 MG/DL (ref 0.2–1)
BUN SERPL-MCNC: 20 MG/DL (ref 7–18)
BUN/CREAT SERPL: 24 (ref 12–20)
CALCIUM SERPL-MCNC: 9.5 MG/DL (ref 8.5–10.1)
CHLORIDE SERPL-SCNC: 111 MMOL/L (ref 100–108)
CO2 SERPL-SCNC: 21 MMOL/L (ref 21–32)
CREAT SERPL-MCNC: 0.83 MG/DL (ref 0.6–1.3)
GLOBULIN SER CALC-MCNC: 3.5 G/DL (ref 2–4)
GLUCOSE SERPL-MCNC: 146 MG/DL (ref 74–99)
POTASSIUM SERPL-SCNC: 3.9 MMOL/L (ref 3.5–5.5)
PROT SERPL-MCNC: 7.2 G/DL (ref 6.4–8.2)
SODIUM SERPL-SCNC: 144 MMOL/L (ref 136–145)
TSH SERPL DL<=0.05 MIU/L-ACNC: 0.02 UIU/ML (ref 0.36–3.74)

## 2018-06-18 PROCEDURE — 36415 COLL VENOUS BLD VENIPUNCTURE: CPT | Performed by: FAMILY MEDICINE

## 2018-06-18 PROCEDURE — 83036 HEMOGLOBIN GLYCOSYLATED A1C: CPT | Performed by: FAMILY MEDICINE

## 2018-06-18 PROCEDURE — 80053 COMPREHEN METABOLIC PANEL: CPT | Performed by: FAMILY MEDICINE

## 2018-06-18 PROCEDURE — 82043 UR ALBUMIN QUANTITATIVE: CPT | Performed by: FAMILY MEDICINE

## 2018-06-18 PROCEDURE — 84443 ASSAY THYROID STIM HORMONE: CPT | Performed by: FAMILY MEDICINE

## 2018-06-18 RX ORDER — ATORVASTATIN CALCIUM 40 MG/1
40 TABLET, FILM COATED ORAL DAILY
Qty: 90 TAB | Refills: 1 | Status: SHIPPED | OUTPATIENT
Start: 2018-06-18 | End: 2019-06-06 | Stop reason: SDUPTHER

## 2018-06-18 RX ORDER — LANCETS
EACH MISCELLANEOUS
Qty: 100 EACH | Refills: 6 | Status: SHIPPED | OUTPATIENT
Start: 2018-06-18 | End: 2019-02-18 | Stop reason: SDUPTHER

## 2018-06-18 RX ORDER — INSULIN GLARGINE 100 [IU]/ML
20 INJECTION, SOLUTION SUBCUTANEOUS DAILY
Qty: 5 PEN | Refills: 3 | Status: SHIPPED | OUTPATIENT
Start: 2018-06-18 | End: 2019-02-18 | Stop reason: SDUPTHER

## 2018-06-18 RX ORDER — AMLODIPINE BESYLATE 5 MG/1
5 TABLET ORAL DAILY
Qty: 90 TAB | Refills: 1 | Status: SHIPPED | OUTPATIENT
Start: 2018-06-18 | End: 2019-02-18 | Stop reason: SDUPTHER

## 2018-06-18 RX ORDER — HYDROCHLOROTHIAZIDE 25 MG/1
25 TABLET ORAL DAILY
Qty: 90 TAB | Refills: 1 | Status: SHIPPED | OUTPATIENT
Start: 2018-06-18 | End: 2019-02-18 | Stop reason: SDUPTHER

## 2018-06-18 RX ORDER — CLOPIDOGREL BISULFATE 75 MG/1
75 TABLET ORAL DAILY
Qty: 90 TAB | Refills: 1 | Status: SHIPPED | OUTPATIENT
Start: 2018-06-18 | End: 2019-02-18 | Stop reason: SDUPTHER

## 2018-06-18 NOTE — MR AVS SNAPSHOT
[facility] 
 
 
 [address] [facility] [address] 
[phone] Patient: Sandra Gonzales MRN: [id number] VJI:[birth year] Visit Information Date & Time Provider Department Dept. Phone Encounter #  
 6/18/2018  2:15 PM Yvette Culp, [address] [phone] [id number] Upcoming Health Maintenance Date Due Hepatitis C Screening 1960 LIPID PANEL Q1 1960 FOOT EXAM Q1 7/2/1972 MICROALBUMIN Q1 7/2/1972 EYE EXAM RETINAL OR DILATED Q1 7/2/1972 Pneumococcal 19-64 Medium Risk (1 of 1 - PPSV23) 7/2/1981 DTaP/Tdap/Td series (1 - Tdap) 7/2/1983 PAP AKA CERVICAL CYTOLOGY 7/2/1983 BREAST CANCER SCRN MAMMOGRAM 7/2/2012 FOBT Q 1 YEAR AGE 50-75 7/2/2012 MEDICARE YEARLY EXAM 3/14/2018 HEMOGLOBIN A1C Q6M 5/27/2018 Influenza Age 5 to Adult 8/1/2018 Allergies as of 6/18/2018  Review Complete On: 6/18/2018 By: Milka Rodrigues LPN Severity Noted Reaction Type Reactions Ace Inhibitors High 11/13/2013    Other (comments) Other reaction(s): anaphylaxis/angioedema See lisinopril  Comments. Throat closure Lisinopril High 11/13/2013    Swelling Other reaction(s): anaphylaxis/angioedema Was  Hospitalized   At  [facility]   With angioedema  And  Had  To be  Intubated and  Provided with  [facility] vent support. Penicillin V High 10/01/2013    Shortness of Breath Current Immunizations  Never Reviewed Name Date Influenza Vaccine 10/20/2011 12:00 AM  
  
 Not reviewed this visit You Were Diagnosed With   
  
 Codes Comments Rotator cuff syndrome, right    -  Primary ICD-10-CM: M75.101 ICD-9-CM: 726.10 Type 2 diabetes mellitus without complication, with long-term current use of insulin (HCC)     ICD-10-CM: E11.9, Z79.4 ICD-9-CM: 250.00, V58.67 Vitals BP Pulse Temp Resp Height(growth percentile) Weight(growth percentile) (!) 123/101 (BP 1 Location: Left arm, BP Patient Position: Sitting) 74 97.1 °F (36.2 °C) (Oral) 20 5' 3\" (1.6 m) 199 lb 9.6 oz (90.5 kg) SpO2 BMI OB Status Smoking Status 99% 35.36 kg/m2 Hysterectomy Current Every Day Smoker Vitals History BMI and BSA Data Body Mass Index Body Surface Area  
 35.36 kg/m 2 2.01 m 2 Preferred Pharmacy Pharmacy Name Phone [facility] [address] Your Updated Medication List  
  
   
This list is accurate as of 6/18/18  2:57 PM.  Always use your most recent med list. amLODIPine 5 mg tablet Commonly known as:  Dariela Rogers Take 1 Tab by mouth daily. atorvastatin 40 mg tablet Commonly known as:  LIPITOR Take 1 Tab by mouth daily. clopidogrel 75 mg Tab Commonly known as:  PLAVIX Take 1 Tab by mouth daily. erythromycin ophthalmic ointment Commonly known as:  ILOTYCIN Place a small ribbon of ointment in the right eye four times daily for 7 days  
  
 esomeprazole 40 mg capsule Commonly known as:  NEXIUM  
40 mg.  
  
 fluticasone 50 mcg/actuation nasal spray Commonly known as:  Francella Lacks 2 Sprays by Both Nostrils route daily. glucose blood VI test strips strip Commonly known as:  FREESTYLE TEST Use to test blood sugar 1-2 times per day  
  
 hydroCHLOROthiazide 25 mg tablet Commonly known as:  HYDRODIURIL Take 1 Tab by mouth daily. inhalational spacing device Use every time you use your inhaler  
  
 insulin glargine 100 unit/mL (3 mL) Inpn Commonly known as:  LANTUSBASAGLAR  
20 Units by SubCUTAneous route daily. Lancets Misc Check sugars once a day Lancing Device Misc Commonly known as:  AUTO-LANCET MINI Use to hold lancets  
  
 nystatin 100,000 unit/mL suspension Commonly known as:  MYCOSTATIN  
  
 * PROAIR HFA 90 mcg/actuation inhaler Generic drug:  albuterol PROAIR  (90 Base) MCG/ACT AERS  
  
 * albuterol 90 mcg/actuation inhaler Commonly known as:  PROVENTIL HFA, VENTOLIN HFA, PROAIR HFA Take 2 Puffs by inhalation every four (4) hours as needed for Wheezing. pseudoephedrine 60 mg tablet Commonly known as:  SUDAFED Take 1 Tab by mouth every six (6) hours as needed for Congestion. tiZANidine 4 mg tablet Commonly known as:  Sheela Ino Take 1 Tab by mouth every six (6) hours as needed. Back pain WELLBUTRIN  mg SR tablet Generic drug:  buPROPion SR  
150 mg.  
  
 * Notice: This list has 2 medication(s) that are the same as other medications prescribed for you. Read the directions carefully, and ask your doctor or other care provider to review them with you. Prescriptions Sent to Pharmacy Refills  
 insulin glargine (LANTUS,BASAGLAR) 100 unit/mL (3 mL) inpn 3 Si Units by SubCUTAneous route daily. Class: Normal  
 Pharmacy: [address] [place] [address], [address] [place] [address] Ph #: [phone] Route: SubCUTAneous Lancets misc 6 Sig: Check sugars once a day Class: Normal  
 Pharmacy: [address] [place] [address], [address] [place] [address] Ph #: [phone] We Performed the Following REFERRAL TO PHYSICAL THERAPY [IHV67 Custom] Comments:  
 Please evaluate patient for right shoulder rotator cuff syndrome. To-Do List   
 2018 Lab:  HEMOGLOBIN A1C WITH EAG   
  
 2018 Lab:  METABOLIC PANEL, COMPREHENSIVE   
  
 2018 Lab:  MICROALBUMIN, UR, RAND W/ MICROALB/CREAT RATIO   
  
 2018 Lab:  TSH 3RD GENERATION Referral Information Referral ID Referred By Referred To  
  
 [id number] [facility]   
   [address] [address] Phone: [phone] Visits Status Start Date End Date 1 New Request 18 If your referral has a status of pending review or denied, additional information will be sent to support the outcome of this decision. Introducing [facility]! [facility] introduces [employer] patient portal. Now you can access parts of your medical record, email your doctor's office, and request medication refills online. 1. In your internet browser, go to [email or web address] [employer] 2. Click on the First Time User? Click Here link in the Sign In box. You will see the New Member Sign Up page. 3. Enter your [employer] Access Code exactly as it appears below. You will not need to use this code after youve completed the sign-up process. If you do not sign up before the expiration date, you must request a new code. · [employer] Access Code: [email or web address] Expires: 9/16/2018  2:49 PM 
 
4. Enter the last four digits of your Social Security Number (xxxx) and Date of Birth (mm/dd/yyyy) as indicated and click Submit. You will be taken to the next sign-up page. 5. Create a [employer] ID. This will be your [employer] login ID and cannot be changed, so think of one that is secure and easy to remember. 6. Create a [employer] password. You can change your password at any time. 7. Enter your Password Reset Question and Answer. This can be used at a later time if you forget your password. 8. Enter your e-mail address. You will receive e-mail notification when new information is available in [address] 9. Click Sign Up. You can now view and download portions of your medical record. 10. Click the Download Summary menu link to download a portable copy of your medical information. If you have questions, please visit the Frequently Asked Questions section of the [employer] website. Remember, [employer] is NOT to be used for urgent needs. For medical emergencies, dial 911. Now available from your iPhone and Android! Please provide this summary of care documentation to your next provider. Your primary care clinician is listed as Shauna Vega. If you have any questions after today's visit, please call [phone].

## 2018-06-18 NOTE — PROGRESS NOTES
ROOM # 9    Den Suarez presents today for   Chief Complaint   Patient presents with    Diabetes       Den Suarez preferred language for health care discussion is english/other. Is someone accompanying this pt? no    Is the patient using any DME equipment during OV? no    Depression Screening:  PHQ over the last two weeks 3/1/2018 12/21/2017 11/27/2017   Little interest or pleasure in doing things Not at all Several days Not at all   Feeling down, depressed or hopeless Not at all Several days Not at all   Total Score PHQ 2 0 2 0       Learning Assessment:  Learning Assessment 11/27/2017   PRIMARY LEARNER Patient   HIGHEST LEVEL OF EDUCATION - PRIMARY LEARNER  SOME COLLEGE   PRIMARY LANGUAGE ENGLISH   LEARNER PREFERENCE PRIMARY DEMONSTRATION   ANSWERED BY patient   RELATIONSHIP SELF       Health Maintenance reviewed and discussed per provider. Yes    Den Suarez is due for   Health Maintenance Due   Topic Date Due    Hepatitis C Screening  1960    LIPID PANEL Q1  1962    FOOT EXAM Q1  07/02/1972    MICROALBUMIN Q1  07/02/1972    EYE EXAM RETINAL OR DILATED Q1  07/02/1972    Pneumococcal 19-64 Medium Risk (1 of 1 - PPSV23) 07/02/1981    DTaP/Tdap/Td series (1 - Tdap) 07/02/1983    PAP AKA CERVICAL CYTOLOGY  07/02/1983    BREAST CANCER SCRN MAMMOGRAM  07/02/2012    FOBT Q 1 YEAR AGE 50-75  07/02/2012    MEDICARE YEARLY EXAM  03/14/2018    HEMOGLOBIN A1C Q6M  05/27/2018     Please order/place referral if appropriate. Advance Directive:  1. Do you have an advance directive in place? Patient Reply: no    2. If not, would you like material regarding how to put one in place? Patient Reply: no    Coordination of Care:  1. Have you been to the ER, urgent care clinic since your last visit? Hospitalized since your last visit? no    2. Have you seen or consulted any other health care providers outside of the [facility] since your last visit?  Include any pap smears or colon screening.  no

## 2018-06-19 DIAGNOSIS — R79.89 LOW TSH LEVEL: Primary | ICD-10-CM

## 2018-06-19 LAB
CREAT UR-MCNC: 267.63 MG/DL (ref 30–125)
EST. AVERAGE GLUCOSE BLD GHB EST-MCNC: 180 MG/DL
HBA1C MFR BLD: 7.9 % (ref 4.2–5.6)
MICROALBUMIN UR-MCNC: 1.1 MG/DL (ref 0–3)
MICROALBUMIN/CREAT UR-RTO: 4 MG/G (ref 0–30)

## 2018-06-20 ENCOUNTER — TELEPHONE (OUTPATIENT)
Dept: INTERNAL MEDICINE CLINIC | Age: 58
End: 2018-06-20

## 2018-06-20 NOTE — TELEPHONE ENCOUNTER
Contacted pt. 2 pt identifiers confirmed. Pt notified needs to come in to office for further lab work to be completed. Pt verbalized understanding of all information. No further questions or concerns at this time.

## 2018-06-20 NOTE — TELEPHONE ENCOUNTER
----- Message from Dg Collier MD sent at 6/19/2018  5:26 PM EDT -----  Please call this patient and ask her to come in for additional lab tests on her thyroid function.

## 2018-06-25 ENCOUNTER — OFFICE VISIT (OUTPATIENT)
Dept: INTERNAL MEDICINE CLINIC | Age: 58
End: 2018-06-25

## 2018-06-25 VITALS
SYSTOLIC BLOOD PRESSURE: 131 MMHG | BODY MASS INDEX: 35.19 KG/M2 | HEART RATE: 72 BPM | HEIGHT: 63 IN | TEMPERATURE: 97 F | OXYGEN SATURATION: 100 % | DIASTOLIC BLOOD PRESSURE: 84 MMHG | RESPIRATION RATE: 18 BRPM | WEIGHT: 198.6 LBS

## 2018-06-25 DIAGNOSIS — E11.9 TYPE 2 DIABETES MELLITUS WITHOUT COMPLICATION, WITH LONG-TERM CURRENT USE OF INSULIN (HCC): Chronic | ICD-10-CM

## 2018-06-25 DIAGNOSIS — M24.811 INTERNAL DERANGEMENT OF RIGHT SHOULDER: Primary | ICD-10-CM

## 2018-06-25 DIAGNOSIS — Z79.4 TYPE 2 DIABETES MELLITUS WITHOUT COMPLICATION, WITH LONG-TERM CURRENT USE OF INSULIN (HCC): Chronic | ICD-10-CM

## 2018-06-25 RX ORDER — LIDOCAINE 50 MG/G
PATCH TOPICAL
COMMUNITY
Start: 2018-06-22

## 2018-06-25 RX ORDER — METHOCARBAMOL 750 MG/1
750-1500 TABLET, FILM COATED ORAL
Qty: 90 TAB | Refills: 2 | Status: SHIPPED | OUTPATIENT
Start: 2018-06-25 | End: 2018-07-18 | Stop reason: CLARIF

## 2018-06-25 RX ORDER — PREDNISONE 10 MG/1
10 TABLET ORAL
COMMUNITY
Start: 2018-06-22 | End: 2018-06-30

## 2018-06-25 RX ORDER — KETOROLAC TROMETHAMINE 30 MG/ML
60 INJECTION, SOLUTION INTRAMUSCULAR; INTRAVENOUS ONCE
Qty: 1 VIAL | Refills: 0
Start: 2018-06-25 | End: 2018-06-25

## 2018-06-25 NOTE — PROGRESS NOTES
Chuy Hightower presents today for   Chief Complaint   Patient presents with    Shoulder Pain     right shoulder pain, heavy lifting incident 5 mos ago       Chuy Hightower preferred language for health care discussion is english/other. Is someone accompanying this pt? no    Is the patient using any DME equipment during [address]? Yes, arm sling    Depression Screening:  PHQ over the last two weeks 6/25/2018 6/25/2018 3/1/2018 12/21/2017 11/27/2017   Little interest or pleasure in doing things Not at all Not at all Not at all Several days Not at all   Feeling down, depressed or hopeless Not at all Not at all Not at all Several days Not at all   Total Score PHQ 2 0 0 0 2 0       Learning Assessment:  Learning Assessment 11/27/2017   PRIMARY LEARNER Patient   HIGHEST LEVEL OF EDUCATION - PRIMARY LEARNER  SOME COLLEGE   PRIMARY LANGUAGE ENGLISH   LEARNER PREFERENCE PRIMARY DEMONSTRATION   ANSWERED BY patient   RELATIONSHIP SELF       Abuse Screening:  Abuse Screening Questionnaire 6/25/2018   Do you ever feel afraid of your partner? N   Are you in a relationship with someone who physically or mentally threatens you? N   Is it safe for you to go home? Y       Fall Risk  No flowsheet data found. Health Maintenance reviewed and discussed per provider. Yes    Chuy Hightower is due for   Health Maintenance Due   Topic Date Due    Hepatitis C Screening  1960    LIPID PANEL Q1  1960    FOOT EXAM Q1  07/02/1972    EYE EXAM RETINAL OR DILATED Q1  07/02/1972    Pneumococcal 19-64 Medium Risk (1 of 1 - PPSV23) 07/02/1981    DTaP/Tdap/Td series (1 - Tdap) 07/02/1983    PAP AKA CERVICAL CYTOLOGY  07/02/1983    BREAST CANCER SCRN MAMMOGRAM  07/02/2012    FOBT Q 1 YEAR AGE 50-75  07/02/2012    MEDICARE YEARLY EXAM  03/14/2018   HM to be reviewed by provider. Please order/place referral if appropriate. Advance Directive:  1. Do you have an advance directive in place? Patient Reply: no    2.  If not, would you like material regarding how to put one in place? Patient Reply: no    Coordination of Care:  1. Have you been to the ER, urgent care clinic since your last visit? [facility] your [facility]    2. Have you seen or consulted any other health care providers outside of the [address] [place] [address] since your last visit? Include any pap smears or colon screening.  no

## 2018-06-25 NOTE — PROGRESS NOTES
FAMILY MEDICINE CLINIC NOTE    S: The patient presents for follow up on right shoulder pain. She was seen at [address] and was told that she had torn ligaments in the right glenohumeral region. Tizanidine is not helping with the pain. She needs a referral to Dr. Philip Springer, Endocrinology for her diabetes    Current Outpatient Prescriptions on File Prior to Visit   Medication Sig Dispense Refill    insulin glargine (LANTUS,BASAGLAR) 100 unit/mL (3 mL) inpn 20 Units by SubCUTAneous route daily. 5 Pen 3    Lancets misc Check sugars once a day 100 Each 6    hydroCHLOROthiazide (HYDRODIURIL) 25 mg tablet Take 1 Tab by mouth daily. 90 Tab 1    amLODIPine (NORVASC) 5 mg tablet Take 1 Tab by mouth daily. 90 Tab 1    atorvastatin (LIPITOR) 40 mg tablet Take 1 Tab by mouth daily. 90 Tab 1    clopidogrel (PLAVIX) 75 mg tab Take 1 Tab by mouth daily. 90 Tab 1    fluticasone (FLONASE) 50 mcg/actuation nasal spray 2 Sprays by Both Nostrils route daily. 1 Bottle 2    buPROPion SR (WELLBUTRIN SR) 150 mg SR tablet 150 mg.      esomeprazole (NEXIUM) 40 mg capsule 40 mg.      nystatin (MYCOSTATIN) 100,000 unit/mL suspension       albuterol (PROVENTIL HFA, VENTOLIN HFA, PROAIR HFA) 90 mcg/actuation inhaler Take 2 Puffs by inhalation every four (4) hours as needed for Wheezing. 1 Inhaler 3    inhalational spacing device Use every time you use your inhaler 1 Device 0    pseudoephedrine (SUDAFED) 60 mg tablet Take 1 Tab by mouth every six (6) hours as needed for Congestion.  40 Tab 0    glucose blood VI test strips (FREESTYLE TEST) strip Use to test blood sugar 1-2 times per day 100 Strip 3    Lancing Device (AUTO-LANCET MINI) misc Use to hold lancets 1 Each 0    albuterol (PROAIR HFA) 90 mcg/actuation inhaler PROAIR  (90 Base) MCG/ACT AERS      erythromycin (ILOTYCIN) ophthalmic ointment Place a small ribbon of ointment in the right eye four times daily for 7 days 1 g 0     No current facility-administered medications on file prior to visit. Past Medical History:   Diagnosis Date    Arthritis     rheumatoid    Diabetes ([facility]     Emphysema lung ([facility]     Hypertension        Social History     Social History    Marital status: LEGALLY      Spouse name: N/A    Number of children: N/A    Years of education: N/A     Occupational History    Not on file. Social History Main Topics    Smoking status: Current Every Day Smoker     Packs/day: 1.00    Smokeless tobacco: Never Used    Alcohol use Yes    Drug use: No    Sexual activity: Yes     Partners: Male     Other Topics Concern    Not on file     Social History Narrative       Family History   Problem Relation Age of Onset    Cancer Mother      lung and breast    No Known Problems Father        O:  Visit Vitals    /84 (BP 1 Location: Left arm, BP Patient Position: Sitting)    Pulse 72    Temp 97 °F (36.1 °C) (Oral)    Resp 18    Ht 5' 3\" (1.6 m)    Wt 198 lb 9.6 oz (90.1 kg)    SpO2 100%    BMI 35.18 kg/m2     NAD, comfortable  RRR, no murmurs  CTABL, no wheezing/ronchi/rales  Right arm in sling  Decreased passive ROM of the right shoulder   Pain with abduction of the right arm    56 y.o. female      ICD-10-CM ICD-9-CM    1. Internal derangement of right shoulder M24.811 719.81 ketorolac tromethamine (TORADOL) 60 mg/2 mL soln      KETOROLAC TROMETHAMINE INJ      ME THER/PROPH/DIAG INJECTION, SUBCUT/IM      REFERRAL TO ORTHOPEDICS      methocarbamol (ROBAXIN) 750 mg tablet   2.  Type 2 diabetes mellitus without complication, with long-term current use of insulin ([facility]) E11.9 250.00 REFERRAL TO ENDOCRINOLOGY    Z79.4 V58.67

## 2018-06-25 NOTE — MR AVS SNAPSHOT
[facility] 
 
 
 [address] [facility] [address] [address] 
[phone] Patient: Delano Cruz MRN: [id number] QWR:[birth year]2430 Visit Information Date & Time Provider Department Dept. Phone Encounter #  
 6/25/2018  4:00 PM [place] [phone] [id number] Upcoming Health Maintenance Date Due Hepatitis C Screening 1960 LIPID PANEL Q1 1962 FOOT EXAM Q1 7/2/1972 EYE EXAM RETINAL OR DILATED Q1 7/2/1972 Pneumococcal 19-64 Medium Risk (1 of 1 - PPSV23) 7/2/1981 DTaP/Tdap/Td series (1 - Tdap) 7/2/1983 PAP AKA CERVICAL CYTOLOGY 7/2/1983 BREAST CANCER SCRN MAMMOGRAM 7/2/2012 FOBT Q 1 YEAR AGE 50-75 7/2/2012 MEDICARE YEARLY EXAM 3/14/2018 Influenza Age 5 to Adult 8/1/2018 HEMOGLOBIN A1C Q6M 12/18/2018 MICROALBUMIN Q1 6/18/2019 Allergies as of 6/25/2018  Review Complete On: 6/25/2018 By: Luna Maynard LPN Severity Noted Reaction Type Reactions Ace Inhibitors High 11/13/2013    Other (comments) Other reaction(s): anaphylaxis/angioedema See lisinopril  Comments. Throat closure Lisinopril High 11/13/2013    Swelling Other reaction(s): anaphylaxis/angioedema Was  Hospitalized   At  [facility]   With angioedema  And  Had  To be  Intubated and  Provided with  [facility] vent support. Penicillin V High 10/01/2013    Shortness of Breath Current Immunizations  Never Reviewed Name Date Influenza Vaccine 10/20/2011 12:00 AM  
  
 Not reviewed this visit You Were Diagnosed With   
  
 Codes Comments Internal derangement of right shoulder    -  Primary ICD-10-CM: M24.811 ICD-9-CM: 719.81 Type 2 diabetes mellitus without complication, with long-term current use of insulin (HCC)     ICD-10-CM: E11.9, Z79.4 ICD-9-CM: 250.00, V58.67 Vitals BP Pulse Temp Resp Height(growth percentile) Weight(growth percentile) 131/84 (BP 1 Location: Left arm, BP Patient Position: Sitting) 72 97 °F (36.1 °C) (Oral) 18 5' 3\" (1.6 m) 198 lb 9.6 oz (90.1 kg) SpO2 BMI OB Status Smoking Status 100% 35.18 kg/m2 Hysterectomy Current Every Day Smoker Vitals History BMI and BSA Data Body Mass Index Body Surface Area  
 35.18 kg/m 2 2 m 2 Preferred Pharmacy Pharmacy Name Phone [facility] [address] Your Updated Medication List  
  
   
This list is accurate as of 6/25/18  5:10 PM.  Always use your most recent med list. amLODIPine 5 mg tablet Commonly known as:  Caralee Dupes Take 1 Tab by mouth daily. atorvastatin 40 mg tablet Commonly known as:  LIPITOR Take 1 Tab by mouth daily. clopidogrel 75 mg Tab Commonly known as:  PLAVIX Take 1 Tab by mouth daily. erythromycin ophthalmic ointment Commonly known as:  ILOTYCIN Place a small ribbon of ointment in the right eye four times daily for 7 days  
  
 esomeprazole 40 mg capsule Commonly known as:  NEXIUM  
40 mg.  
  
 fluticasone 50 mcg/actuation nasal spray Commonly known as:  Wenda Maze 2 Sprays by Both Nostrils route daily. glucose blood VI test strips strip Commonly known as:  FREESTYLE TEST Use to test blood sugar 1-2 times per day  
  
 hydroCHLOROthiazide 25 mg tablet Commonly known as:  HYDRODIURIL Take 1 Tab by mouth daily. inhalational spacing device Use every time you use your inhaler  
  
 insulin glargine 100 unit/mL (3 mL) Inpn Commonly known as:  LANTUSBASAGLAR  
20 Units by SubCUTAneous route daily. ketorolac tromethamine 60 mg/2 mL Soln Commonly known as:  TORADOL  
2 mL by IntraMUSCular route once for 1 dose. Lancets Misc Check sugars once a day Lancing Device Misc Commonly known as:  AUTO-LANCET MINI Use to hold lancets  
  
 lidocaine 5 % Commonly known as:  Arthor Double  
 Apply 1 patch as directed for 12 hours every 24 hours (12 hours on, 12 hours off) methocarbamol 750 mg tablet Commonly known as:  ROBAXIN Take 1-2 Tabs by mouth three (3) times daily as needed. nystatin 100,000 unit/mL suspension Commonly known as:  MYCOSTATIN  
  
 predniSONE 10 mg tablet Commonly known as:  DELTASONE  
10 mg.  
  
 * PROAIR HFA 90 mcg/actuation inhaler Generic drug:  albuterol PROAIR  (90 Base) MCG/ACT AERS  
  
 * albuterol 90 mcg/actuation inhaler Commonly known as:  PROVENTIL HFA, VENTOLIN HFA, PROAIR HFA Take 2 Puffs by inhalation every four (4) hours as needed for Wheezing. pseudoephedrine 60 mg tablet Commonly known as:  SUDAFED Take 1 Tab by mouth every six (6) hours as needed for Congestion. WELLBUTRIN  mg SR tablet Generic drug:  buPROPion SR  
150 mg.  
  
 * Notice: This list has 2 medication(s) that are the same as other medications prescribed for you. Read the directions carefully, and ask your doctor or other care provider to review them with you. Prescriptions Sent to Pharmacy Refills  
 methocarbamol (ROBAXIN) 750 mg tablet 2 Sig: Take 1-2 Tabs by mouth three (3) times daily as needed. Class: Normal  
 Pharmacy: [address] [place] [address] #: [phone] Route: Oral  
  
We Performed the Following KETOROLAC TROMETHAMINE INJ [ [facility]] [place] THER/PROPH/DIAG INJECTION, SUBCUT/IM [id number] CPT(R)] REFERRAL TO ENDOCRINOLOGY [IPC85 Custom] Comments:  
 Diabetes type 2 REFERRAL TO ORTHOPEDICS [XUX953 Custom] Comments:  
 Right shoulder pain secondary to ligamentous tear To-Do List   
 06/27/2018 9:00 AM  
  Appointment with Bushra James, PT at [facility] PT [address] [facility] ([phone]) Referral Information Referral ID Referred By Referred To  
  
 [id number] Vince BARRETT Not Available Visits Status Start Date End Date 1 New Request 6/25/18 6/25/19 If your referral has a status of pending review or denied, additional information will be sent to support the outcome of this decision. Referral ID Referred By Referred To  
 [id number] [facility] Dea Young Mariama Quiros Phone: [phone] Fax: [phone] Visits Status Start Date End Date 1 New Request 6/25/18 6/25/19 If your referral has a status of pending review or denied, additional information will be sent to support the outcome of this decision. Introducing [facility]! Lore Adan introduces [employer] patient portal. Now you can access parts of your medical record, email your doctor's office, and request medication refills online. 1. In your internet browser, go to [email or web address] 2. Click on the First Time User? Click Here link in the Sign In box. You will see the New Member Sign Up page. 3. Enter your [employer] Access Code exactly as it appears below. You will not need to use this code after youve completed the sign-up process. If you do not sign up before the expiration date, you must request a new code. · [employer] Access Code: [email or web address] Expires: 9/16/2018  2:49 PM 
 
4. Enter the last four digits of your Social Security Number (xxxx) and Date of Birth (mm/dd/yyyy) as indicated and click Submit. You will be taken to the next sign-up page. 5. Create a BioVentrixt ID. This will be your [employer] login ID and cannot be changed, so think of one that is secure and easy to remember. 6. Create a [employer]Vent[employer] password. You can change your password at any time. 7. Enter your Password Reset Question and Answer. This can be used at a later time if you forget your password. 8. Enter your e-mail address. You will receive e-mail notification when new information is available in [address] 9. Click Sign Up. You can now view and download portions of your medical record. 10. Click the Download Summary menu link to download a portable copy of your medical information. If you have questions, please visit the Frequently Asked Questions section of the airpim website. Remember, [employer]im is NOT to be used for urgent needs. For medical emergencies, dial 911. Now available from your iPhone and Android! Please provide this summary of care documentation to your next provider. Your primary care clinician is listed as Tona Bridges. If you have any questions after today's visit, please call [phone].

## 2018-06-26 ENCOUNTER — TELEPHONE (OUTPATIENT)
Dept: INTERNAL MEDICINE CLINIC | Age: 58
End: 2018-06-26

## 2018-06-26 RX ORDER — METAXALONE 800 MG/1
800 TABLET ORAL
Qty: 120 TAB | Refills: 1 | Status: SHIPPED | OUTPATIENT
Start: 2018-06-26 | End: 2018-07-18 | Stop reason: CLARIF

## 2018-06-26 NOTE — TELEPHONE ENCOUNTER
Unsuccessful attempt to reach pt for below. Left message for her to call back at her earliest convenience. Aptmt c Dr. Ceci Garner (ortho) Thurs 6/28/18 @ [phone].   Miguel Angel [place]. [address] [place] [address]  PT. TO BRING COPY OF ANY IMAGING TO OV.

## 2018-06-27 ENCOUNTER — APPOINTMENT (OUTPATIENT)
Dept: PHYSICAL THERAPY | Age: 58
End: 2018-06-27

## 2018-07-06 ENCOUNTER — OFFICE VISIT (OUTPATIENT)
Dept: INTERNAL MEDICINE CLINIC | Age: 58
End: 2018-07-06

## 2018-07-06 ENCOUNTER — TELEPHONE (OUTPATIENT)
Dept: INTERNAL MEDICINE CLINIC | Age: 58
End: 2018-07-06

## 2018-07-06 VITALS
WEIGHT: 202 LBS | TEMPERATURE: 98 F | BODY MASS INDEX: 35.79 KG/M2 | HEIGHT: 63 IN | RESPIRATION RATE: 16 BRPM | DIASTOLIC BLOOD PRESSURE: 91 MMHG | SYSTOLIC BLOOD PRESSURE: 132 MMHG | HEART RATE: 64 BPM | OXYGEN SATURATION: 99 %

## 2018-07-06 DIAGNOSIS — M62.838 MUSCLE SPASM: Primary | ICD-10-CM

## 2018-07-06 DIAGNOSIS — K04.7 TOOTH ABSCESS: Primary | ICD-10-CM

## 2018-07-06 DIAGNOSIS — K08.89 TOOTH PAIN: ICD-10-CM

## 2018-07-06 RX ORDER — DOXYCYCLINE 100 MG/1
100 TABLET ORAL 2 TIMES DAILY
Qty: 20 TAB | Refills: 0 | Status: SHIPPED | OUTPATIENT
Start: 2018-07-06 | End: 2018-07-16

## 2018-07-06 RX ORDER — TRAMADOL HYDROCHLORIDE 50 MG/1
50 TABLET ORAL
Qty: 15 TAB | Refills: 0 | Status: SHIPPED | OUTPATIENT
Start: 2018-07-06 | End: 2018-07-11

## 2018-07-06 NOTE — MR AVS SNAPSHOT
[facility] 
 
 
 [address] [facility] [id number] 
[phone] Patient: Prerna Chen MRN: [id number] AGO:9/7/2803 Visit Information Date & Time Provider Department Dept. Phone Encounter #  
 7/6/2018  1:00 PM RIZWANA Brennan Spencer 139 [id number] Upcoming Health Maintenance Date Due Hepatitis C Screening 1960 LIPID PANEL Q1 1960 FOOT EXAM Q1 7/2/1972 EYE EXAM RETINAL OR DILATED Q1 7/2/1972 Pneumococcal 19-64 Medium Risk (1 of 1 - PPSV23) 7/2/1981 DTaP/Tdap/Td series (1 - Tdap) 7/2/1983 PAP AKA CERVICAL CYTOLOGY 7/2/1983 BREAST CANCER SCRN MAMMOGRAM 7/2/2012 FOBT Q 1 YEAR AGE 50-75 7/2/2012 MEDICARE YEARLY EXAM 3/14/2018 Influenza Age 5 to Adult 8/1/2018 HEMOGLOBIN A1C Q6M 12/18/2018 MICROALBUMIN Q1 6/18/2019 Allergies as of 7/6/2018  Review Complete On: 7/6/2018 By: Ever Coelho LPN Severity Noted Reaction Type Reactions Ace Inhibitors High 11/13/2013    Other (comments) Other reaction(s): anaphylaxis/angioedema See lisinopril  Comments. Throat closure Lisinopril High 11/13/2013    Swelling Other reaction(s): anaphylaxis/angioedema Was  Hospitalized   At  [facility]   With angioedema  And  Had  To be  Intubated and  Provided with  [facility] vent support. Penicillin V High 10/01/2013    Shortness of Breath Current Immunizations  Never Reviewed Name Date Influenza Vaccine 10/20/2011 12:00 AM  
  
 Not reviewed this visit You Were Diagnosed With   
  
 Codes Comments Tooth abscess    -  Primary ICD-10-CM: K04.7 ICD-9-CM: 522.5 Tooth pain     ICD-10-CM: K08.89 ICD-9-CM: 525.9 Vitals BP Pulse Temp Resp Height(growth percentile) Weight(growth percentile) (!) 132/91 64 98 °F (36.7 °C) (Oral) 16 5' 3\" (1.6 m) 202 lb (91.6 kg) SpO2 BMI OB Status Smoking Status 99% 35.78 kg/m2 Hysterectomy Current Every Day Smoker Vitals History BMI and BSA Data Body Mass Index Body Surface Area 35.78 kg/m 2 2.02 m 2 Preferred Pharmacy Pharmacy Name Phone [facility] [address] Your Updated Medication List  
  
   
This list is accurate as of 7/6/18  1:21 PM.  Always use your most recent med list. amLODIPine 5 mg tablet Commonly known as:  Grant Presser Take 1 Tab by mouth daily. atorvastatin 40 mg tablet Commonly known as:  LIPITOR Take 1 Tab by mouth daily. clopidogrel 75 mg Tab Commonly known as:  PLAVIX Take 1 Tab by mouth daily. doxycycline 100 mg tablet Commonly known as:  ADOXA Take 1 Tab by mouth two (2) times a day for 10 days. erythromycin ophthalmic ointment Commonly known as:  ILOTYCIN Place a small ribbon of ointment in the right eye four times daily for 7 days  
  
 esomeprazole 40 mg capsule Commonly known as:  NEXIUM  
40 mg.  
  
 fluticasone 50 mcg/actuation nasal spray Commonly known as:  Euna Joe 2 Sprays by Both Nostrils route daily. glucose blood VI test strips strip Commonly known as:  FREESTYLE TEST Use to test blood sugar 1-2 times per day  
  
 hydroCHLOROthiazide 25 mg tablet Commonly known as:  HYDRODIURIL Take 1 Tab by mouth daily. inhalational spacing device Use every time you use your inhaler  
  
 insulin glargine 100 unit/mL (3 mL) Inpn Commonly known as:  LANTUS,BASAGLAR  
20 Units by SubCUTAneous route daily. Lancets Misc Check sugars once a day Lancing Device Misc Commonly known as:  AUTO-LANCET MINI Use to hold lancets  
  
 lidocaine 5 % Commonly known as:  Cecile Chávez Apply 1 patch as directed for 12 hours every 24 hours (12 hours on, 12 hours off) metaxalone 800 mg tablet Commonly known as:  SKELAXIN  
 Take 1 Tab by mouth four (4) times daily as needed for Pain. methocarbamol 750 mg tablet Commonly known as:  ROBAXIN Take 1-2 Tabs by mouth three (3) times daily as needed. nystatin 100,000 unit/mL suspension Commonly known as:  MYCOSTATIN  
  
 * PROAIR HFA 90 mcg/actuation inhaler Generic drug:  albuterol PROAIR  (90 Base) MCG/ACT AERS  
  
 * albuterol 90 mcg/actuation inhaler Commonly known as:  PROVENTIL HFA, VENTOLIN HFA, PROAIR HFA Take 2 Puffs by inhalation every four (4) hours as needed for Wheezing. pseudoephedrine 60 mg tablet Commonly known as:  SUDAFED Take 1 Tab by mouth every six (6) hours as needed for Congestion. traMADol 50 mg tablet Commonly known as:  ULTRAM  
Take 1 Tab by mouth every eight (8) hours as needed for Pain for up to 5 days. Max Daily Amount: 150 mg. WELLBUTRIN  mg SR tablet Generic drug:  buPROPion SR  
150 mg.  
  
 * Notice: This list has 2 medication(s) that are the same as other medications prescribed for you. Read the directions carefully, and ask your doctor or other care provider to review them with you. Prescriptions Printed Refills  
 traMADol (ULTRAM) 50 mg tablet 0 Sig: Take 1 Tab by mouth every eight (8) hours as needed for Pain for up to 5 days. Max Daily Amount: 150 mg.  
 Class: Print Route: Oral  
  
Prescriptions Sent to Pharmacy Refills  
 doxycycline (ADOXA) 100 mg tablet 0 Sig: Take 1 Tab by mouth two (2) times a day for 10 days. Class: Normal  
 Pharmacy: [address] [place] [address] Ph #: [phone] Route: Oral  
  
To-Do List   
 07/19/2018 3:00 PM  
  Appointment with Kain Lim, PT at [facility] PT [address] ([phone]) Patient Instructions Tooth and Gum Pain: Care Instructions Your Care Instructions The most common causes of dental pain are tooth decay and gum disease. Pain can also be caused by an infection of the tooth (abscess) or the gums. Or you may have pain from a broken or cracked tooth. Other causes of pain include infection and damage to a tooth from nervous grinding of your teeth. A wisdom tooth can be painful when it is coming in but cannot break through the gum. It can also be painful when the tooth is only partway in and extra gum tissue has formed around it. The tissue can get inflamed (pericoronitis), and sometimes it gets infected. Prompt dental care can help find the cause of your toothache and keep the tooth from dying or gum disease from getting worse. Self-care at home may reduce your pain and discomfort. Follow-up care is a key part of your treatment and safety. Be sure to make and go to all appointments, and call your dentist or doctor if you are having problems. It's also a good idea to know your test results and keep a list of the medicines you take. How can you care for yourself at home? · To reduce pain and facial swelling, put an ice or cold pack on the outside of your cheek for 10 to 20 minutes at a time. Put a thin cloth between the ice and your skin. Do not use heat. · If your doctor prescribed antibiotics, take them as directed. Do not stop taking them just because you feel better. You need to take the full course of antibiotics. · Ask your doctor if you can take an over-the-counter pain medicine, such as acetaminophen (Tylenol), ibuprofen (Advil, Motrin), or naproxen (Aleve). Be safe with medicines. Read and follow all instructions on the label. · Avoid very hot, cold, or sweet foods and drinks if they increase your pain. · Rinse your mouth with warm salt water every 2 hours to help relieve pain and swelling. Mix 1 teaspoon of salt in 8 ounces of water. · Talk to your dentist about using special toothpaste for sensitive teeth.  To reduce pain on contact with heat or cold or when brushing, brush with this toothpaste regularly or rub a small amount of the paste on the sensitive area with a clean finger 2 or 3 times a day. Floss gently between your teeth. · Do not smoke or use spit tobacco. Tobacco use can make gum problems worse, decreases your ability to fight infection in your gums, and delays healing. If you need help quitting, talk to your doctor about stop-smoking programs and medicines. These can increase your chances of quitting for good. When should you call for help? Call 911 anytime you think you may need emergency care. For example, call if: 
? · You have trouble breathing. ?Call your dentist or doctor now or seek immediate medical care if: 
? · You have signs of infection, such as: 
¨ Increased pain, swelling, warmth, or redness. ¨ Red streaks leading from the area. ¨ Pus draining from the area. ¨ A fever. ? Watch closely for changes in your health, and be sure to contact your doctor if: 
? · You do not get better as expected. Where can you learn more? Go to http://polly-dixie.info/. Enter [id number] [id number] in the search box to learn more about \"Tooth and Gum Pain: Care Instructions. \" Current as of: May 12, 2017 Content Version: 11.4 © 6521-2620 Healthwise, Incorporated. Care instructions adapted under license by [employer] (which disclaims liability or warranty for this information). If you have questions about a medical condition or this instruction, always ask your healthcare professional. [place] [address] any warranty or liability for your use of this information. Introducing [facility]! [facility] introduces nuvoTV patient portal. Now you can access parts of your medical record, email your doctor's office, and request medication refills online. 1. In your internet browser, go to [email or web address] 2. Click on the First Time User? Click Here link in the Sign In box.  You will see the New Member Sign Up page. 3. Enter your watAgame Access Code exactly as it appears below. You will not need to use this code after youve completed the sign-up process. If you do not sign up before the expiration date, you must request a new code. · watAgame Access Code: [email or web address] Expires: 9/16/2018  2:49 PM 
 
4. Enter the last four digits of your Social Security Number (xxxx) and Date of Birth (mm/dd/yyyy) as indicated and click Submit. You will be taken to the next sign-up page. 5. Create a watAgame ID. This will be your watAgame login ID and cannot be changed, so think of one that is secure and easy to remember. 6. Create a watAgame password. You can change your password at any time. 7. Enter your Password Reset Question and Answer. This can be used at a later time if you forget your password. 8. Enter your e-mail address. You will receive e-mail notification when new information is available in [address]. 9. Click Sign Up. You can now view and download portions of your medical record. 10. Click the Download Summary menu link to download a portable copy of your medical information. If you have questions, please visit the Frequently Asked Questions section of the watAgame website. Remember, watAgame is NOT to be used for urgent needs. For medical emergencies, dial 911. Now available from your iPhone and Android! Please provide this summary of care documentation to your next provider. Your primary care clinician is listed as Marcela Hurtado. If you have any questions after today's visit, please call [phone].

## 2018-07-06 NOTE — PROGRESS NOTES
Identified pt with two pt identifiers(name and ). Reviewed record in preparation for visit and have obtained necessary documentation. Chief Complaint   Patient presents with    Dental Pain     pt c/o irritation of right bottom gumline from dentures; pt also c/o tooth pain; discomfort present for several days    Medication Refill     robaxin        Health Maintenance Due   Topic    Hepatitis C Screening     LIPID PANEL Q1     FOOT EXAM Q1     EYE EXAM RETINAL OR DILATED Q1     Pneumococcal 19-64 Medium Risk (1 of 1 - PPSV23)    DTaP/Tdap/Td series (1 - Tdap)    PAP AKA CERVICAL CYTOLOGY     BREAST CANCER SCRN MAMMOGRAM     FOBT Q 1 YEAR AGE 50-75     MEDICARE YEARLY EXAM        Coordination of Care Questionnaire:  :   1) Have you been to an emergency room, urgent care clinic since your last visit? no   Hospitalized since your last visit? no             2. Have seen or consulted any other health care provider since your last visit? YES  If yes, where when, and reason for visit? 2018: -Dr. Lucia Sheehan (ortho)    3) Do you have an Advanced Directive/ Living Will in place? NO  If yes, do we have a copy on file NO  If no, would you like information NO    Patient is accompanied by self I have received verbal consent from Juanjo Ambrose to discuss any/all medical information while they are present in the room.

## 2018-07-06 NOTE — PROGRESS NOTES
HISTORY OF PRESENT ILLNESS  Manuela Ruiz is a 66 y.o. female. Dental Pain    The history is provided by the patient. This is a new problem. The current episode started more than 1 week ago. The problem occurs constantly. The problem has been gradually worsening. The pain is located in the right lower mouth. The quality of the pain is aching. The pain is at a severity of 5/10. The pain is moderate. Associated symptoms include swelling and gum redness. There was no vomiting, no nausea, no fever, no chest pain, no shortness of breath, no headaches and no drainage. She has tried nothing for the symptoms. The treatment provided no relief. The patient has no cardiac history. Medication Refill   Pertinent negatives include no chest pain, no headaches and no shortness of breath. Review of Systems   Constitutional: Negative for chills and fever. HENT: Positive for dental problem. Negative for congestion. Eyes: Negative for blurred vision and double vision. Respiratory: Negative for shortness of breath. Cardiovascular: Negative for chest pain and palpitations. Musculoskeletal: Positive for back pain, joint pain and neck pain. Negative for falls. Neurological: Negative for dizziness and headaches. Psychiatric/Behavioral: Negative for depression. Physical Exam   Constitutional: She is oriented to person, place, and time. No distress. HENT:   Head: Normocephalic and atraumatic. Mouth/Throat: Oropharynx is clear and moist. She has dentures. Abnormal dentition. Dental abscesses and dental caries present. Eyes: EOM are normal. Pupils are equal, round, and reactive to light. Neck: Neck supple. Cardiovascular: Regular rhythm. Pulmonary/Chest: Breath sounds normal.   Neurological: She is alert and oriented to person, place, and time. Skin: She is not diaphoretic. Psychiatric: She has a normal mood and affect. Nursing note and vitals reviewed.       ASSESSMENT and PLAN    ICD-10-CM ICD-9-CM    1. Tooth abscess K04.7 522.5 doxycycline (ADOXA) 100 mg tablet   2. Tooth pain K08.89 525.9 doxycycline (ADOXA) 100 mg tablet      traMADol (ULTRAM) 50 mg tablet   patient advised to contact her dentist and schedule an appointment for evaluation ASAP. Patient verbalized the understanding.

## 2018-07-06 NOTE — PATIENT INSTRUCTIONS
Tooth and Gum Pain: Care Instructions  Your Care Instructions    The most common causes of dental pain are tooth decay and gum disease. Pain can also be caused by an infection of the tooth (abscess) or the gums. Or you may have pain from a broken or cracked tooth. Other causes of pain include infection and damage to a tooth from nervous grinding of your teeth. A wisdom tooth can be painful when it is coming in but cannot break through the gum. It can also be painful when the tooth is only partway in and extra gum tissue has formed around it. The tissue can get inflamed (pericoronitis), and sometimes it gets infected. Prompt dental care can help find the cause of your toothache and keep the tooth from dying or gum disease from getting worse. Self-care at home may reduce your pain and discomfort. Follow-up care is a key part of your treatment and safety. Be sure to make and go to all appointments, and call your dentist or doctor if you are having problems. It's also a good idea to know your test results and keep a list of the medicines you take. How can you care for yourself at home? · To reduce pain and facial swelling, put an ice or cold pack on the outside of your cheek for 10 to 20 minutes at a time. Put a thin cloth between the ice and your skin. Do not use heat. · If your doctor prescribed antibiotics, take them as directed. Do not stop taking them just because you feel better. You need to take the full course of antibiotics. · Ask your doctor if you can take an over-the-counter pain medicine, such as acetaminophen (Tylenol), ibuprofen (Advil, Motrin), or naproxen (Aleve). Be safe with medicines. Read and follow all instructions on the label. · Avoid very hot, cold, or sweet foods and drinks if they increase your pain. · Rinse your mouth with warm salt water every 2 hours to help relieve pain and swelling. Mix 1 teaspoon of salt in 8 ounces of water.   · Talk to your dentist about using special toothpaste for sensitive teeth. To reduce pain on contact with heat or cold or when brushing, brush with this toothpaste regularly or rub a small amount of the paste on the sensitive area with a clean finger 2 or 3 times a day. Floss gently between your teeth. · Do not smoke or use spit tobacco. Tobacco use can make gum problems worse, decreases your ability to fight infection in your gums, and delays healing. If you need help quitting, talk to your doctor about stop-smoking programs and medicines. These can increase your chances of quitting for good. When should you call for help? Call 911 anytime you think you may need emergency care. For example, call if:  ? · You have trouble breathing. ?Call your dentist or doctor now or seek immediate medical care if:  ? · You have signs of infection, such as:  ¨ Increased pain, swelling, warmth, or redness. ¨ Red streaks leading from the area. ¨ Pus draining from the area. ¨ A fever. ? Watch closely for changes in your health, and be sure to contact your doctor if:  ? · You do not get better as expected. Where can you learn more? Go to http://polly-dixie.info/. Enter [id number] in the search box to learn more about \"Tooth and Gum Pain: Care Instructions. \"  Current as of: May 12, 2017  Content Version: 11.4  © 0326-6641 Healthwise, Incorporated. Care instructions adapted under license by [employer] (which disclaims liability or warranty for this information). If you have questions about a medical condition or this instruction, always ask your healthcare professional. [place] [address] any warranty or liability for your use of this information.

## 2018-07-07 NOTE — TELEPHONE ENCOUNTER
[facility] Medicare Prescription Drug Coverage Determination FormCoverage Determination Form for Prescription Drugs [phone]  The plan will fax you a determination, typically within 1 to 5 business days.

## 2018-07-18 RX ORDER — CYCLOBENZAPRINE HCL 5 MG
5 TABLET ORAL
Qty: 15 TAB | Refills: 0 | Status: SHIPPED | OUTPATIENT
Start: 2018-07-18

## 2018-07-18 NOTE — TELEPHONE ENCOUNTER
Medication was denied. Reason for denial: the drug that you requested is not on our formulary. We are denying your request because there are other drugs on the formulary to treat your condition. You have not had previous trial and failure of on at least 2 formulary alternative drugs. Other covered drugs are Baclofen, tizanidine hcl tablet, dantrolene sodium, cyclobenzaprine hcl 5 mg and 10 mg. Please be advised.

## 2018-07-18 NOTE — TELEPHONE ENCOUNTER
It looks like she already tried tizanidine. This was prescribed on 3/1/18. She has tried robaxin. Sent electronically:    Requested Prescriptions     Signed Prescriptions Disp Refills    cyclobenzaprine (FLEXERIL) 5 mg tablet 15 Tab 0     Sig: Take 1 Tab by mouth two (2) times daily as needed for Muscle Spasm(s). Authorizing Provider: Damien Eagle     She cannot take this medication while driving or operating heavy machinery. Any history of seizures?

## 2018-07-19 ENCOUNTER — HOSPITAL ENCOUNTER (OUTPATIENT)
Dept: PHYSICAL THERAPY | Age: 58
Discharge: HOME | End: 2018-07-19
Payer: MEDICARE

## 2018-07-19 ENCOUNTER — TELEPHONE (OUTPATIENT)
Dept: INTERNAL MEDICINE CLINIC | Age: 58
End: 2018-07-19

## 2018-07-19 PROCEDURE — 97140 MANUAL THERAPY 1/> REGIONS: CPT

## 2018-07-19 PROCEDURE — G8984 CARRY CURRENT STATUS: HCPCS

## 2018-07-19 PROCEDURE — 97012 MECHANICAL TRACTION THERAPY: CPT

## 2018-07-19 PROCEDURE — G8985 CARRY GOAL STATUS: HCPCS

## 2018-07-19 PROCEDURE — 97162 PT EVAL MOD COMPLEX 30 MIN: CPT

## 2018-07-19 NOTE — PROGRESS NOTES
[address] [facility] [address] [facility] [address] [facility], [place] [address]  Phone: [phone] Fax: [phone] / STATEMENT OF MEDICAL NECESSITY FOR PHYSICAL THERAPY SERVICES  Patient Name: Anthony Cheney : 1960   Medical   Diagnosis: Right shoulder pain [M25.511] Treatment Diagnosis: R shoulder pain    Onset Date: 5 months before IE     Referral Source: Jorge Cotton, * Start of Care [facility]): 2018   Prior Hospitalization: See medical history Provider #: [id number]   Prior Level of Function: WNL, I   Comorbidities: DM, RA, CVA 2017; TIA's, HTN, hyperthyroidism, asthma    Medications: Verified on Patient Summary List   The Plan of Care and following information is based on the information from the initial evaluation.   ========================================================================  Assessment / key information:  Pt is a 65 yo female with injury to the R shoulder with heavy lifting 5 months ago. Shoulder pain has become worse during the past few months with jostling, reaching. Also notes new onset of R UE paresthesias. Previous rx has included the following: none. She presents with pain ranging from 3-10/10, located R shoulder and arm. Pain is made worse with sitting, reaching, better with medications, arm OH positron eases paresthesias. Visual Inspection reveals WNL. Palpation reveals TTP R UT, LS, posterior cuff. Posture fair. GHJ AROM WNL all planes except limited to T10 FIR. PROM with min c/o End range pain flexion. PROM: ER at 90: 100, IR at 80: 79. GHJ MMT min limited with p! Abduction and ER. Sensation is reduced to light touch in the full R UE. + median nerve tension tests.  + C/S compression and distraction, + Spurlings. C/S AROM: limited R rotation and SB, limited extension. Functional limitations include: sleeping (pt does bakody's position for ease).   Pt will benefit from PT interventions to address the aforementioned deficits and allow pt to return to PLOF.    ========================================================================  Eval Complexity: History: MEDIUM  Complexity : 1-2 comorbidities / personal factors will impact the outcome/ POC Exam:HIGH Complexity : 4+ Standardized tests and measures addressing body structure, function, activity limitation and / or participation in recreation  Presentation: MEDIUM Complexity : Evolving with changing characteristics  Clinical Decision Making:MEDIUM Complexity : FOTO score of 26-74Overall Complexity:MEDIUM  Problem List: pain affecting function, decrease ROM, decrease strength, impaired gait/ balance, decrease ADL/ functional abilitiies, decrease activity tolerance, decrease flexibility/ joint mobility and decrease transfer abilities   Treatment Plan may include any combination of the following: Therapeutic exercise, Therapeutic activities, Neuromuscular re-education, Physical agent/modality, Gait/balance training, Manual therapy, Patient education, Self Care training, Functional mobility training and Home safety training  Patient / Family readiness to learn indicated by: asking questions, trying to perform skills and interest  Persons(s) to be included in education: patient (P)  Barriers to Learning/Limitations: None  Measures taken:    Patient Goal (s): \"healing\"   Patient self reported health status: fair  Rehabilitation Potential: good   Short Term Goals: To be accomplished in  2  treatments:  1. Pt will be independent and compliant with HEP to decrease pain, increase ROM and return pt to PLOF.  Long Term Goals: To be accomplished in  8-10  treatments:  1. Pt will increase score on the FOTO to > or = 68/100 to demo an increase in functional activity tolerance. 2. Pt will have an increase in R GHJ AROM to > or = T7 FIR degrees to improve reaching, dressing, self-care  3. Pt will be able to self-manage ave pain to < or = 2/10 to restore ease of movement, self-care, work. 4. Pt will have centralization of sxs to proximal to the shoulder to improve ease of sleeping and ADLs   Frequency / Duration:   Patient to be seen  2  times per week for 8-10  treatments:  Patient / Caregiver education and instruction: self care, activity modification and exercises  G-Codes (GP): Carry [id number] Current  CJ= 20-39%    Goal  CJ= 20-39%. The severity rating is based on the FOTO Score  Therapist Signature: Maribell Morris DPT Date: 7/38/2181   Certification Period: 7/19/18 to 10/17/18 Time: 1:33 PM   ========================================================================  I certify that the above Physical Therapy Services are being furnished while the patient is under my care. I agree with the treatment plan and certify that this therapy is necessary. Physician Signature:        Date:       Time:   Please sign and return to [facility] or you may fax the signed copy to [phone]. Thank you.   \

## 2018-07-19 NOTE — PROGRESS NOTES
PT DAILY TREATMENT NOTE     Patient Name: Marcial Alvares  Date:2018  : 1960  [x]  Patient  Verified  Payor: [place] MEDICARE / Plan: [place] MEDICARE PART A & B / Product Type: Medicare /    In time:3:09  Out time:4:03  Total Treatment Time (min): 54  Total Timed Codes (min): 42  1:1 Treatment Time (min): 8   Visit #: 1 of 8-10    Treatment Area: Right shoulder pain [M25.511]    SUBJECTIVE  Pain Level (0-10 scale): 3  Any medication changes, allergies to medications, adverse drug reactions, diagnosis change, or new procedure performed?: [x] No    [] Yes (see summary sheet for update)  Subjective functional status/changes:   [] No changes reported  SEE IE for Subjective Information    OBJECTIVE  Modality rationale: decrease pain and increase tissue extensibility to improve the patients ability to improve sxs in the R arm    Min Type Additional Details    [] Estim: []Att   []Unatt        []TENS instruct                  []IFC  []Premod   []NMES                     []Other:  []w/US   []w/ice   []w/heat  Position:  Location:   12 [x]  Traction: [x] Cervical       []Lumbar                       [] Prone          [x]Supine                       []Intermittent   [x]Continuous Lbs: 17/5; with moist heat   [] before manual  [x] after manual    []  Ultrasound: []Continuous   [] Pulsed                           []1MHz   []3MHz Location:  W/cm2:    []  Iontophoresis with dexamethasone         Location: [] Take home patch   [] In clinic    []  Ice     []  heat  []  Ice massage Position:  Location:    []  Vasopneumatic Device Pressure:       [] lo [] med [] hi   Temperature: [] lo [] med [] hi   [x] Skin assessment post-treatment:  [x]intact []redness- no adverse reaction       []redness - adverse reaction:       8 min Manual Therapy:  Manual traction, PROM R GHJ ; DTM to R scapular girdle and UT in L SL    Rationale: decrease pain, increase ROM and increase tissue extensibility to iprove sxs in the R GHJ           x min Patient Education: [x] Review HEP    [] Progressed/Changed HEP based on:   [] positioning   [] body mechanics   [] transfers   [] heat/ice application        Other Objective/Functional Measures:   OBJECTIVE:   Posture: poor, FHRS;  L hand dom but R hand works  A lot because of deyanira fixation limiting the wrist    Inspection: WNL  ROM:  See below  C/S flexion 50, ext 35, LSB 39, RSB 42,  L rot 85, R Rot 60    AROM (B) GHJ WNL except limited in the R GHJ FIR;                                             AROM                                                              PROM   Left Right  Left Right   Flexion   Flexion     Extension   Extension     Scaption/ABD   Scaptin/ABD     ER @ 0 Degrees   ER @ 0 Degrees     IR/ADD T6 T10 IR/ADD       Strength:                                                                         L (1-5) R (1-5) Pain   Flexors 5 5 _ Yes   [] No   Abductors 5 4+ [x] Yes   [] No   External Rotators 5 4+ [x] Yes   [] No   Internal Rotators 5  [] Yes   [] No   Supraspinatus 5  [] Yes   [] No   Serratus Anterior   [] Yes   [] No   Lower Trapezius   [] Yes   [] No   Elbow Flexion 5 5 [] Yes   [x] No   Elbow Extension 5 5 [] Yes   [x] No    strength :  equal    Scapulohumoral Control / Rhythm:  Able to eccentrically lower with good control?  Left: [x] Yes   [] No     Right: [x] Yes   [] No    Palpation  [] Min  [x] Mod  [] Severe    Location: UT, posterior cuff    Special Tests: Median N glide; (-) Ulnar and radial          Pain Level (0-10 scale) post treatment: 1,     ASSESSMENT/Changes in Function: see IE; pt notes decrease in sxs to minimally in the R hand     Patient will continue to benefit from skilled PT services to modify and progress therapeutic interventions, address functional mobility deficits, address ROM deficits, address strength deficits, analyze and address soft tissue restrictions, analyze and cue movement patterns, analyze and modify body mechanics/ergonomics, assess and modify postural abnormalities and instruct in home and community integration to attain remaining goals.      [x]  See Plan of Care  []  See progress note/recertification  []  See Discharge Summary         Progress towards goals / Updated goals:  SEE IE FOR GOALS     PLAN  []  Upgrade activities as tolerated     []  Continue plan of care  []  Update interventions per flow sheet       []  Discharge due to:_  [x]  Other:Initiate POC as stated in the IE      Justification for Eval Code Complexity:  Patient History : DM  Examination see IE  Clinical Presentation: evolving with changing   Clinical Decision Making : FOTO 61/100     Jessa Espino DPT 7/19/2018  3:11 PM

## 2018-07-20 NOTE — TELEPHONE ENCOUNTER
Attempted to contact pt at  number, no answer. Lvm for pt to return call to office at [phone]. Will continue to try to contact pt.

## 2018-07-20 NOTE — TELEPHONE ENCOUNTER
Patient returning call states she has One Touch Ultra Mini. One touch Delica testing strips are needed for machine per patient.

## 2018-07-23 ENCOUNTER — HOSPITAL ENCOUNTER (OUTPATIENT)
Dept: PHYSICAL THERAPY | Age: 58
Discharge: HOME | End: 2018-07-23
Payer: MEDICARE

## 2018-07-23 ENCOUNTER — TELEPHONE (OUTPATIENT)
Dept: INTERNAL MEDICINE CLINIC | Age: 58
End: 2018-07-23

## 2018-07-23 PROCEDURE — 97140 MANUAL THERAPY 1/> REGIONS: CPT

## 2018-07-23 PROCEDURE — 97012 MECHANICAL TRACTION THERAPY: CPT

## 2018-07-23 PROCEDURE — 97110 THERAPEUTIC EXERCISES: CPT

## 2018-07-23 NOTE — TELEPHONE ENCOUNTER
FYI: Patient called pharmacy at office and was told that a form called a DWO will be faxed today, so that the patient can receive her diabetic test strips.

## 2018-07-23 NOTE — PROGRESS NOTES
PT DAILY TREATMENT NOTE     Patient Name: Dory Veloz  Date:2018  : 1960  [x]  Patient  Verified  Payor: [place] MEDICARE / Plan: [place] MEDICARE PART A & B / Product Type: Medicare /    In time: 4:00 pm          Out time: 4:48 pm  Total Treatment Time (min): 48  Total Timed Codes (min): 36  1:1 Treatment Time (min): 25   Visit #: 2 of 8-10    Treatment Area: Right shoulder pain [M25.511]    SUBJECTIVE  Pain Level (0-10 scale): 0  Any medication changes, allergies to medications, adverse drug reactions, diagnosis change, or new procedure performed?: [x] No    [] Yes (see summary sheet for update)  Subjective functional status/changes:   [] No changes reported  \"I am fine. She told me what to do when I started to feel the tingling. \"    OBJECTIVE  Modality rationale: decrease pain to improve the patients ability to perform ADLs   Min Type Additional Details    [] Estim: []Att   []Unatt        []TENS instruct                  []IFC  []Premod   []NMES                     []Other:  []w/US   []w/ice   []w/heat  Position:  Location:   12 [x]  Traction: [x] Cervical       []Lumbar                       [] Prone          [x]Supine                       []Intermittent   [x]Continuous Lbs: 17/5#  [] before manual  [x] after manual    []  Ultrasound: []Continuous   [] Pulsed                           []1MHz   []3MHz Location:  W/cm2:    []  Iontophoresis with dexamethasone         Location: [] Take home patch   [] In clinic    []  Ice     []  heat  []  Ice massage Position:  Location:    []  Vasopneumatic Device Pressure:       [] lo [] med [] hi   Temperature: [] lo [] med [] hi   [x] Skin assessment post-treatment:  [x]intact []redness- no adverse reaction       []redness - adverse reaction:     28 min Therapeutic Exercise:  [x] See flow sheet: initiated therex per IE   Rationale: increase ROM, increase strength and improve coordination to improve the patients ability to perform ADLs    8 min Manual Therapy: DTM to (R) UT and LS; (R) shoulder PROM   Rationale: decrease pain, increase ROM, increase tissue extensibility and decrease trigger points to perform ADLs          X min Patient Education: [x] Review HEP from IE     Other Objective/Functional Measures:   STG met. CS AROM rotation: WNL bilaterally  (R) shoulder PROM: WNL in all directions   VCs for proper C/S positioning ~ 50% of time in clinic. Pain Level (0-10 scale) post treatment: 0    ASSESSMENT/Changes in Function:   Good tolerance to treatment today with patient req 100% verbal/tactile cueing and demo for proper form/technique with all newly introduced therex. Patient will continue to benefit from skilled PT services to modify and progress therapeutic interventions, address functional mobility deficits, address ROM deficits, address strength deficits, analyze and address soft tissue restrictions, analyze and cue movement patterns and analyze and modify body mechanics/ergonomics to attain remaining goals. [x]  See Plan of Care  []  See progress note/recertification  []  See Discharge Summary         Progress towards goals / Updated goals:  Short Term Goals: To be accomplished in  2  treatments:  1. Pt will be independent and compliant with HEP to decrease pain, increase ROM and return pt to PLOF. -Goal met; pt notes compliance (7/23/18)  Long Term Goals: To be accomplished in  8-10  treatments:  1. Pt will increase score on the FOTO to > or = 68/100 to demo an increase in functional activity tolerance. 2. Pt will have an increase in R GHJ AROM to > or = T7 FIR degrees to improve reaching, dressing, self-care  3. Pt will be able to self-manage ave pain to < or = 2/10 to restore ease of movement, self-care, work. 4. Pt will have centralization of sxs to proximal to the shoulder to improve ease of sleeping and ADLs.  -Goal progressing; pt notes centralization of sx into and abolished to the C/S with positioning (7/23/18)    PLAN  [x]  Upgrade activities as tolerated     [x]  Continue plan of care  []  Update interventions per flow sheet       []  Discharge due to:_  [x]  Other: assess response to first f/u tx    [place], LESTER 7/23/2018

## 2018-07-24 NOTE — TELEPHONE ENCOUNTER
Spoke with patient and 2 pt identifiers was confirmed. Informed pt that medication was send over and pt states she has taking the flexeril before with no problem.

## 2018-07-26 ENCOUNTER — HOSPITAL ENCOUNTER (OUTPATIENT)
Dept: PHYSICAL THERAPY | Age: 58
Discharge: HOME | End: 2018-07-26
Payer: MEDICARE

## 2018-07-26 PROCEDURE — 97140 MANUAL THERAPY 1/> REGIONS: CPT

## 2018-07-26 PROCEDURE — 97110 THERAPEUTIC EXERCISES: CPT

## 2018-07-26 PROCEDURE — 97012 MECHANICAL TRACTION THERAPY: CPT

## 2018-07-26 NOTE — PROGRESS NOTES
PT DAILY TREATMENT NOTE     Patient Name: Natacha Ulrich  Date:2018  : 1960  [x]  Patient  Verified  Payor: [employer] / Plan: [place] MEDICARE PART A & B / Product Type: Medicare /    In time: 1:31 pm          Out time: 2:28 pm  Total Treatment Time (min): 57  Total Timed Codes (min): 47  1:1 Treatment Time (min): 28   Visit #: 3 of 8-10    Treatment Area: Right shoulder pain [M25.511]    SUBJECTIVE  Pain Level (0-10 scale): 2  Any medication changes, allergies to medications, adverse drug reactions, diagnosis change, or new procedure performed?: [x] No    [] Yes (see summary sheet for update)  Subjective functional status/changes:   [] No changes reported  \"Not too bad. \"    OBJECTIVE  Modality rationale: decrease pain to improve the patients ability to perform ADLs   Min Type Additional Details    [] Estim: []Att   []Unatt        []TENS instruct                  []IFC  []Premod   []NMES                     []Other:  []w/US   []w/ice   []w/heat  Position:  Location:   15 [x]  Traction: [x] Cervical       []Lumbar                       [] Prone          [x]Supine                       []Intermittent   [x]Continuous Lbs: 20/10#  [] before manual  [x] after manual    []  Ultrasound: []Continuous   [] Pulsed                           []1MHz   []3MHz Location:  W/cm2:    []  Iontophoresis with dexamethasone         Location: [] Take home patch   [] In clinic    []  Ice     []  heat  []  Ice massage Position:  Location:    []  Vasopneumatic Device Pressure:       [] lo [] med [] hi   Temperature: [] lo [] med [] hi   [x] Skin assessment post-treatment:  [x]intact []redness- no adverse reaction       []redness - adverse reaction:     34 min Therapeutic Exercise:  [x] See flow sheet: CS repeated retraction with (R) rotation, added 1/2 FR TS extension to improve thoracic mobility   Rationale: increase ROM, increase strength and improve coordination to improve the patients ability to perform ADLs    8 min Manual Therapy: DTM to (R) UT and LS   Rationale: decrease pain, increase ROM, increase tissue extensibility and decrease trigger points to perform ADLs          X min Patient Education: [x] Review HE     Other Objective/Functional Measures:   Pain: (A) 0-2  C/S AROM into rotation with retraction: WNL    Pain Level (0-10 scale) post treatment: 0    ASSESSMENT/Changes in Function:   Patient demos significant C/S mobility into all directions. Patient is cognizant of use of HEP to centralize sx. Patient will continue to benefit from skilled PT services to modify and progress therapeutic interventions, address functional mobility deficits, address ROM deficits, address strength deficits, analyze and address soft tissue restrictions, analyze and cue movement patterns and analyze and modify body mechanics/ergonomics to attain remaining goals. [x]  See Plan of Care  []  See progress note/recertification  []  See Discharge Summary         Progress towards goals / Updated goals:  Short Term Goals: To be accomplished in  2  treatments:  1. Pt will be independent and compliant with HEP to decrease pain, increase ROM and return pt to PLOF. -Goal met; pt notes compliance (7/23/18)  Long Term Goals: To be accomplished in  8-10  treatments:  1. Pt will increase score on the FOTO to > or = 68/100 to demo an increase in functional activity tolerance. 2. Pt will have an increase in R GHJ AROM to > or = T7 FIR degrees to improve reaching, dressing, self-care  3. Pt will be able to self-manage ave pain to < or = 2/10 to restore ease of movement, self-care, work. 4. Pt will have centralization of sxs to proximal to the shoulder to improve ease of sleeping and ADLs.  -Goal progressing; pt notes centralization of sx into and abolished to the C/S with positioning (7/23/18)    PLAN  [x]  Upgrade activities as tolerated     [x]  Continue plan of care  []  Update interventions per flow sheet       []  Discharge due to:_  [] Other:_    Dioni Coffey, PTA 7/26/2018

## 2018-07-30 ENCOUNTER — HOSPITAL ENCOUNTER (OUTPATIENT)
Dept: PHYSICAL THERAPY | Age: 58
End: 2018-07-30
Payer: MEDICARE

## 2018-08-02 ENCOUNTER — HOSPITAL ENCOUNTER (OUTPATIENT)
Dept: PHYSICAL THERAPY | Age: 58
Discharge: HOME | End: 2018-08-02
Payer: MEDICARE

## 2018-08-02 PROCEDURE — 97140 MANUAL THERAPY 1/> REGIONS: CPT

## 2018-08-02 PROCEDURE — 97110 THERAPEUTIC EXERCISES: CPT

## 2018-08-02 PROCEDURE — 97012 MECHANICAL TRACTION THERAPY: CPT

## 2018-08-02 NOTE — PROGRESS NOTES
PT DAILY TREATMENT NOTE     Patient Name: King Clark  Date:2018  : 1960  [x]  Patient  Verified  Payor: [place] MEDICARE / Plan: [place] MEDICARE PART A & B / Product Type: Medicare /    In time: 9:05  am          Out time: 10:05 am  Total Treatment Time (min): 60  Total Timed Codes (min): 50  1:1 Treatment Time (min): 40   Visit #: 4 of 8-10    Treatment Area: Right shoulder pain [M25.511]    SUBJECTIVE  Pain Level (0-10 scale): 3-4  Any medication changes, allergies to medications, adverse drug reactions, diagnosis change, or new procedure performed?: [x] No    [] Yes (see summary sheet for update)  Subjective functional status/changes:   [] No changes reported  \"It just hurts when I play my bones. \" (dominos) \"i see the Orthopedic doc Dr. Mansoor Goodwin today, so I have to leave by 10:30am\"    OBJECTIVE  Modality rationale: decrease pain to improve the patients ability to perform ADLs   Min Type Additional Details    [] Estim: []Att   []Unatt        []TENS instruct                  []IFC  []Premod   []NMES                     []Other:  []w/US   []w/ice   []w/heat  Position:  Location:   10 [x]  Traction: [x] Cervical       []Lumbar                       [] Prone          [x]Supine                       []Intermittent   [x]Continuous Lbs: 20/10#  [] before manual  [x] after manual    []  Ultrasound: []Continuous   [] Pulsed                           []1MHz   []3MHz Location:  W/cm2:    []  Iontophoresis with dexamethasone         Location: [] Take home patch   [] In clinic    []  Ice     []  heat  []  Ice massage Position:  Location:    []  Vasopneumatic Device Pressure:       [] lo [] med [] hi   Temperature: [] lo [] med [] hi   [x] Skin assessment post-treatment:  [x]intact []redness- no adverse reaction       []redness - adverse reaction:     42 min Therapeutic Exercise:  [x] See flow sheet   Rationale: increase ROM, increase strength and improve coordination to improve the patients ability to perform ADLs    8 min Manual Therapy: DTM to (R) UT and LS   Rationale: decrease pain, increase ROM, increase tissue extensibility and decrease trigger points to perform ADLs          X min Patient Education: [x] Review HEP; discussed C/S positioning for cell phone use, recreational activities     Other Objective/Functional Measures:   Subjective improvement of 50-60% in sx since IE reported. Improvements: sleep quality, reaching, no difficulty with ADLs   Deficits: occasional paresthesias with playing dentaZOOM  Pain: (A) 0-2  C/S AROM: WNL in all directions  (R) shoulder AROM: full elevation, [email or web address] 75 deg  (R) shoulder strength: flex 4/5, abd 4+/5, ext 5/5, ER 5/5, IR 5/5  Paresthesias: centralized and abolished after C/S RRIS and nerve glides    Levator scap TrP (+) with peripheralization - released well today. Pain Level (0-10 scale) post treatment: 0    ASSESSMENT/Changes in Function:   Patient demo'd inc C/S flexion with cell phone use in [facility] pre-tx; pt noted inc paresthesias upon arrival abolished with therex alone. Shoulder and C/S mobility much improved. Discussed at length the importance of C/S posture with daily activities to reduce frequency and intensity of (R) UE paresthesias. Patient will continue to benefit from skilled PT services to modify and progress therapeutic interventions, address functional mobility deficits, address ROM deficits, address strength deficits, analyze and address soft tissue restrictions, analyze and cue movement patterns and analyze and modify body mechanics/ergonomics to attain remaining goals. [x]  See Plan of Care  []  See progress note/recertification  []  See Discharge Summary         Progress towards goals / Updated goals:  1. Pt will increase score on the FOTO to > or = 68/100 to demo an increase in functional activity tolerance. 2. Pt will have an increase in R GHJ AROM to > or = T7 FIR degrees to improve reaching, dressing, self-care  3.  Pt will be able to self-manage ave pain to < or = 2/10 to restore ease of movement, self-care, work. -Goal progressing; ave 0-1/10 pain, 3-4/10 upon arrival today abolished during tx (8/2/18)  4. Pt will have centralization of sxs to proximal to the shoulder to improve ease of sleeping and ADLs.  -Goal progressing; pt notes centralization of sx into and abolished to the C/S with positioning (7/23/18)    PLAN  [x]  Upgrade activities as tolerated     [x]  Continue plan of care  []  Update interventions per flow sheet       []  Discharge due to:_  [x]  Other: assess progress towards FOTO NV; pt to see orthopedic MD today; cont PT for remaining sessions    Bryan Carlos PTA 8/2/2018

## 2018-08-06 ENCOUNTER — HOSPITAL ENCOUNTER (OUTPATIENT)
Dept: PHYSICAL THERAPY | Age: 58
Discharge: HOME | End: 2018-08-06
Payer: MEDICARE

## 2018-08-06 PROCEDURE — 97012 MECHANICAL TRACTION THERAPY: CPT

## 2018-08-06 PROCEDURE — 97110 THERAPEUTIC EXERCISES: CPT

## 2018-08-06 PROCEDURE — 97140 MANUAL THERAPY 1/> REGIONS: CPT

## 2018-08-06 NOTE — PROGRESS NOTES
PT DAILY TREATMENT NOTE     Patient Name: Dory Veloz  Date:2018  : 1960  [x]  Patient  Verified  Payor: [place] MEDICARE / Plan: [place] MEDICARE PART A & B / Product Type: Medicare /    In time:9:06  Out time:10:05  Total Treatment Time (min): 59  Total Timed Codes (min): 49  1:1 Treatment Time (min):9:10 to 9:40 (30)   Visit #: 5 of 8-10    Treatment Area: Right shoulder pain [M25.511]    SUBJECTIVE  Pain Level (0-10 scale): 0  Any medication changes, allergies to medications, adverse drug reactions, diagnosis change, or new procedure performed?: [x] No    [] Yes (see summary sheet for update)  Subjective functional status/changes:   [] No changes reported  MD notes things are great. I don't need an injection and I don't want surgery.      OBJECTIVE  Modality rationale: decrease pain and increase tissue extensibility to improve the patients ability to improve reaching, decrease R UE sxs   Min Type Additional Details    [] Estim: []Att   []Unatt        []TENS instruct                  []IFC  []Premod   []NMES                     []Other:  []w/US   []w/ice   []w/heat  Position:  Location:   15 [x]  Traction: [x] Cervical       []Lumbar                       [] Prone          [x]Supine                       []Intermittent   [x]Continuous Lbs: 20/10#  [] before manual  [x] after manual   [x] Skin assessment post-treatment:  [x]intact []redness- no adverse reaction       []redness - adverse reaction:       34 (20) min Therapeutic Exercise:  [x] See flow sheet : SL ER   Rationale: increase ROM, increase strength and improve coordination to improve the patients ability to improve reaching, ADLs, self-care    10 min Manual Therapy:  Scapular mobs in L SL;  DTM to (R) UT and LS   Rationale: decrease pain, increase ROM, increase tissue extensibility and decrease trigger points to improve reaching, mobility           x min Patient Education: [x] Review HEP    [] Progressed/Changed HEP based on:   [] positioning [] body mechanics   [] transfers   [] heat/ice application        Other Objective/Functional Measures:     R GHJ IR AROM: 28cm to C7; L 27 cm to C7 (difficulty with R > L mobility)    Pain Level (0-10 scale) post treatment: 0    ASSESSMENT/Changes in Function: progressing with FIR AROM with min c/o pain. Demo's WNL C/S AROM in all planes. Decreasing radicular sxs. The TrP in the LE con't to produce radicular sxs. Patient will continue to benefit from skilled PT services to modify and progress therapeutic interventions, address functional mobility deficits, address ROM deficits, address strength deficits, analyze and address soft tissue restrictions, analyze and cue movement patterns, analyze and modify body mechanics/ergonomics, assess and modify postural abnormalities and instruct in home and community integration to attain remaining goals. []  See Plan of Care  []  See progress note/recertification  []  See Discharge Summary         Progress towards goals / Updated goals:  1. Pt will increase score on the FOTO to > or = 68/100 to demo an increase in functional activity tolerance. 2. Pt will have an increase in [address] AROM to > or = T7 FIR degrees to improve reaching, dressing, self-care Progressing with ROM, limited mechanics (8/6/18)  3. Pt will be able to self-manage ave pain to < or = 2/10 to restore ease of movement, self-care, work. -Goal progressing; ave 0-1/10 pain, 3-4/10 upon arrival today abolished during tx (8/2/18)  4. Pt will have centralization of sxs to proximal to the shoulder to improve ease of sleeping and ADLs.  -Goal progressing; pt notes centralization of sx into and abolished to the C/S with positioning (7/23/18)    PLAN  [x]  Upgrade activities as tolerated     [x]  Continue plan of care  []  Update interventions per flow sheet       []  Discharge due to:_  [x]  Other:_ add SA work       Sunshine Liu, PT 8/6/2018  7:39 AM

## 2018-08-09 ENCOUNTER — HOSPITAL ENCOUNTER (OUTPATIENT)
Dept: PHYSICAL THERAPY | Age: 58
End: 2018-08-09
Payer: MEDICARE

## 2018-08-13 ENCOUNTER — HOSPITAL ENCOUNTER (OUTPATIENT)
Dept: PHYSICAL THERAPY | Age: 58
Discharge: HOME | End: 2018-08-13
Payer: MEDICARE

## 2018-08-13 PROCEDURE — 97140 MANUAL THERAPY 1/> REGIONS: CPT

## 2018-08-13 PROCEDURE — 97110 THERAPEUTIC EXERCISES: CPT

## 2018-08-13 NOTE — PROGRESS NOTES
PT DAILY TREATMENT NOTE     Patient Name: Dinora Luis  Date:2018  : 1960  [x]  Patient  Verified  Payor: [place] MEDICARE / Plan: [place] MEDICARE PART A & B / Product Type: Medicare /    In time:9:08  Out time:9:50  Total Treatment Time (min): 42  Total Timed Codes (min): 42  1:1 Treatment Time (min): 9:08 to 9:32 (24)    Visit #: 6 of 8-10    Treatment Area: Right shoulder pain [M25.511]    SUBJECTIVE  Pain Level (0-10 scale): 0  Any medication changes, allergies to medications, adverse drug reactions, diagnosis change, or new procedure performed?: - No    - Yes (see summary sheet for update)  Subjective functional status/changes:   - No changes reported  8 min late for Rx  \"Doing great.  Min tinging in the arm when I was in bed (notes elbow flexion position)      OBJECTIVE  Modality rationale: decrease pain and increase tissue extensibility to improve the patients ability to improve R UE sxs    Min Type Additional Details    [] Estim: []Att   []Unatt        []TENS instruct                  []IFC  []Premod   []NMES                     []Other:  []w/US   []w/ice   []w/heat  Position:  Location:   PD [x]  Traction: [x] Cervical       []Lumbar                       [] Prone          [x]Supine                       []Intermittent   [x]Continuous Lbs: 20/10#  [] before manual  [x] after manual   [x] Skin assessment post-treatment:  [x]intact []redness- no adverse reaction       []redness - adverse reaction:       32 (14)  min Therapeutic Exercise:  [x] See flow sheet :   Rationale: increase ROM, increase strength and improve coordination to improve the patients ability to improve reaching, self-care     10 min Manual Therapy:  DTm to R UT and LS in L SL; Scapular mobs    Rationale: decrease pain, increase ROM and increase tissue extensibility to improve reaching, ADLs          x min Patient Education: [x] Review HEP    [] Progressed/Changed HEP based on:   [] positioning   [] body mechanics   [] transfers [] heat/ice application     Reassessment NV       Other Objective/Functional Measures:     Pain Level (0-10 scale) post treatment: 0    ASSESSMENT/Changes in Function: Pt declined traction as she notes \" no pain\". Improved scapular mobility and decreased tissue tension in periscapular region. Patient will continue to benefit from skilled PT services to modify and progress therapeutic interventions, address functional mobility deficits, address ROM deficits, address strength deficits, analyze and address soft tissue restrictions, analyze and cue movement patterns, analyze and modify body mechanics/ergonomics, assess and modify postural abnormalities and instruct in home and community integration to attain remaining goals. []  See Plan of Care  []  See progress note/recertification  []  See Discharge Summary         Progress towards goals / Updated goals:  1. Pt will increase score on the FOTO to > or = 68/100 to demo an increase in functional activity tolerance. 2. Pt will have an increase in [address] AROM to > or = T7 FIR degrees to improve reaching, dressing, self-care Progressing with ROM, limited mechanics (8/6/18)  3. Pt will be able to self-manage ave pain to < or = 2/10 to restore ease of movement, self-care, work. -Goal progressing; ave 0-1/10 pain, 0/10 during today's full treatment (8/13/18)  4.  Pt will have centralization of sxs to proximal to the shoulder to improve ease of sleeping and ADLs. - Goal progressing; pt notes centralization of sx into and abolished to the C/S with positioning (7/23/18)    PLAN  [x]  Upgrade activities as tolerated     []  Continue plan of care  []  Update interventions per flow sheet       []  Discharge due to:_  [x]  Other:_  Reassessment for 30 day PN KANDIS Hatch, PT 8/13/2018  7:39 AM

## 2018-08-16 ENCOUNTER — HOSPITAL ENCOUNTER (OUTPATIENT)
Dept: PHYSICAL THERAPY | Age: 58
Discharge: HOME | End: 2018-08-16
Payer: MEDICARE

## 2018-08-16 PROCEDURE — G8985 CARRY GOAL STATUS: HCPCS

## 2018-08-16 PROCEDURE — 97110 THERAPEUTIC EXERCISES: CPT

## 2018-08-16 PROCEDURE — G8984 CARRY CURRENT STATUS: HCPCS

## 2018-08-16 PROCEDURE — 97140 MANUAL THERAPY 1/> REGIONS: CPT

## 2018-08-16 NOTE — PROGRESS NOTES
PT DAILY TREATMENT NOTE     Patient Name: Dao Latham  Date:2018  : 1960  [x]  Patient  Verified  Payor: VA MEDICARE / Plan: VA MEDICARE PART A & B / Product Type: Medicare /    In time: 9:01 am           Out time: 10:09 am  Total Treatment Time (min): 68  Total Timed Codes (min): 58  1:1 Treatment Time (min): 53  Visit #: 7 of 8-10    Treatment Area: Right shoulder pain [M25.511]    SUBJECTIVE  Pain Level (0-10 scale): 0  Any medication changes, allergies to medications, adverse drug reactions, diagnosis change, or new procedure performed?: - No    - Yes (see summary sheet for update)  Subjective functional status/changes:   - No changes reported  \"I think I should continue to prevent the pain from returning. \"    OBJECTIVE  Modality rationale: decrease pain and increase tissue extensibility to improve the patients ability to improve R UE sxs    Min Type Additional Details   10 [x] MHP Position: semi-reclined  Location: C/S   PD [x]  Traction: [x] Cervical       []Lumbar                       [] Prone          [x]Supine                       []Intermittent   [x]Continuous Lbs: 20/10#  [] before manual  [x] after manual   [x] Skin assessment post-treatment:  [x]intact []redness- no adverse reaction       []redness - adverse reaction:     50 min Therapeutic Exercise:  [x] See flow sheet: added table planks   Rationale: increase ROM, increase strength and improve coordination to improve the patients ability to improve reaching, self-care     8 min Manual Therapy:  DTM to R UT and LS in L SL; scapular mobs; RS 3x30\"   Rationale: decrease pain, increase ROM and increase tissue extensibility to improve reaching, ADLs          X min Patient Education: [x] Review HEP     Other Objective/Functional Measures:   Subjective improvement of 70% in symptoms since IE reported.   Improvements: neck mobility, shoulder ROM, reaching OH and behind back, lifting, carrying, ADLs  Deficits: OH lifting, intermittent N/T into the thumb  FOTO score: 82/100 (at last assessment, 61/100)  (R) shoulder AROM: flex/ext/abd WNL, [email or web address] 70deg, IR/ADD to level of T8 symmetrical  (R) shoulder strength: flex 5/5, ext 5/5, abd 5/5, IR 5/5, ER 4+/5  (R) scap strength: SA 4/5, MT 4+/5, LT 4-/5  Pain: (A) 0    Cont's with LS TrP abolished with DTM. Pain Level (0-10 scale) post treatment: 0    ASSESSMENT/Changes in Function:   See PN. Patient will continue to benefit from skilled PT services to modify and progress therapeutic interventions, address functional mobility deficits, address ROM deficits, address strength deficits, analyze and address soft tissue restrictions, analyze and cue movement patterns, analyze and modify body mechanics/ergonomics, assess and modify postural abnormalities and instruct in home and community integration to attain remaining goals. []  See Plan of Care  [x]  See progress note/recertification (2/83/07)  []  See Discharge Summary         Progress towards goals / Updated goals:  1. Pt will increase score on the FOTO to > or = 68/100 to demo an increase in functional activity tolerance. -Goal met; inc to 82/100 from 61/100 at IE  2. Pt will have an increase in R GHJ AROM to > or = T7 FIR degrees to improve reaching, dressing, self-care -Goal near met; inc to T8 from T10 at IE  3. Pt will be able to self-manage ave pain to < or = 2/10 to restore ease of movement, self-care, work. -Goal met; pt notes ave 0/10 pain  4.  Pt will have centralization of sxs to proximal to the shoulder to improve ease of sleeping and ADLs. - Goal met; pt notes full centralization and abolishment of sx    PLAN  [x]  Upgrade activities as tolerated     [x]  Continue plan of care  []  Update interventions per flow sheet       []  Discharge due to:_  [x]  Other: see PN; cont for remaining scheduled sessions then prepare patient for D/C

## 2018-08-20 ENCOUNTER — HOSPITAL ENCOUNTER (OUTPATIENT)
Dept: PHYSICAL THERAPY | Age: 58
Discharge: HOME | End: 2018-08-20
Payer: MEDICARE

## 2018-08-20 PROCEDURE — G8985 CARRY GOAL STATUS: HCPCS

## 2018-08-20 PROCEDURE — 97110 THERAPEUTIC EXERCISES: CPT

## 2018-08-20 PROCEDURE — G8986 CARRY D/C STATUS: HCPCS

## 2018-08-20 PROCEDURE — 97140 MANUAL THERAPY 1/> REGIONS: CPT

## 2018-08-20 NOTE — PROGRESS NOTES
PT DAILY TREATMENT NOTE     Patient Name: Prerna Chen  Date:2018  : 1960  [x]  Patient  Verified  Payor: [place] MEDICARE / Plan: [place] MEDICARE PART A & B / Product Type: Medicare /    In time:9:07  Out time:9:30  Total Treatment Time (min): 23  Total Timed Codes (min): 23  1:1 Treatment Time (min): 23   Visit #: 1 of 4    Treatment Area: Right shoulder pain [M25.511]    SUBJECTIVE  Pain Level (0-10 scale): 0  Any medication changes, allergies to medications, adverse drug reactions, diagnosis change, or new procedure performed?: [x] No    [] Yes (see summary sheet for update)  Subjective functional status/changes:   [] No changes reported  Pt was 7 minutes late for Rx  Notes FIR is 'coming ok\"     OBJECTIVE  Modality rationale: decrease edema, decrease inflammation and decrease pain to improve the patients ability to improve reaching    Min Type Additional Details    [] Estim: []Att   []Unatt        []TENS instruct                  []IFC  []Premod   []NMES                     []Other:  []w/US   []w/ice   []w/heat  Position:  Location:   D/C  [x]  Traction: [x] Cervical       []Lumbar                       [] Prone          [x]Supine                       []Intermittent   []Continuous Lbs: 20/10#  [] before manual  [x] after manual    []  Ultrasound: []Continuous   [] Pulsed                           []1MHz   []3MHz Location:  W/cm2:    []  Iontophoresis with dexamethasone         Location: [] Take home patch   [] In clinic   TC []  Ice     [x]  heat  []  Ice massage Position: semi-reclined  Location: C/S    []  Vasopneumatic Device Pressure:       [] lo [] med [] hi   Temperature: [] lo [] med [] hi   [x] Skin assessment post-treatment:  [x]intact []redness- no adverse reaction       []redness - adverse reaction:       15 min Therapeutic Exercise:  [x] See flow sheet : NV - initiate 3 was AROM, prone T's, increased rows and extension, Tband IR and ER    Rationale: increase ROM, increase strength and improve coordination to improve the patients ability to improve reaching, OH mobility     8 min Manual Therapy:  DTm to R UT and LS, scapular mobs in (L) SL    Rationale: decrease pain, increase ROM, increase tissue extensibility and decrease trigger points to improve reaching         x min Patient Education: [x] Review HEP    [] Progressed/Changed HEP based on:   [] positioning   [] body mechanics   [] transfers   [] heat/ice application        Other Objective/Functional Measures:   TC due to pt with family member in poor health and distracted  R GHJ AROM T8    Pain Level (0-10 scale) post treatment: 0    ASSESSMENT/Changes in Function: slow progress towars goals due to late and TC with finishing full session. Progressed FIR to T8 R GHJ     Patient will continue to benefit from skilled PT services to modify and progress therapeutic interventions, address functional mobility deficits, address ROM deficits, address strength deficits, analyze and address soft tissue restrictions, analyze and cue movement patterns, analyze and modify body mechanics/ergonomics, assess and modify postural abnormalities and instruct in home and community integration to attain remaining goals. []  See Plan of Care  []  See progress note/recertification  []  See Discharge Summary         Progress towards goals / Updated goals:  1. Pt will have an increase in [address] AROM to > or = T7 FIR degrees to improve reaching, dressing, self-care Goal progressing to T8 (8/20/18)   2.   Pt will rate a +5 on the GROC to demo increase in functional activity tolerance        PLAN  [x]  Upgrade activities as tolerated     []  Continue plan of care  []  Update interventions per flow sheet       []  Discharge due to:_  []  Other:_      Yanet Carcamo, PT 8/20/2018  7:27 AM

## 2018-08-20 NOTE — PROGRESS NOTES
[address] [facility] [address] [facility] [address]    [address] [facility] [place], [place]  Phone: [phone] Fax: [phone]  PROGRESS NOTE  Patient Name: Orlin Ridley : 1960   Treatment/Medical Diagnosis: Right shoulder pain [M25.511]   Referral Source: Carlos Karel, *     Date of Initial Visit: 18 Attended Visits: 7 Missed Visits: 4     SUMMARY OF TREATMENT  Pt is a 65 yo female with injury to the R shoulder with heavy lifting 5 months ago. Ther ex including strengthening, ROM, flexibility, stabilization; manual therapy including: DTM, mechanical traction,  Patient education; HEP, postural education, repeated movements   CURRENT STATUS  Pt is making excellent progress in therapy. Pain is 0/10 ave, and pt rates 70% improvement. Score on the FOTO has improved from 61/100 at IE to 82/100 demonstrating significant functional improvements. She notes minimal radicular sxs into the R UE. Improvements: neck mobility, shoulder ROM, reaching OH and behind back, lifting, carrying, ADLs  Deficits: OH lifting, intermittent N/T into the thumb  (R) shoulder AROM: flex/ext/abd WNMARY [email or web address], [email or web address] 70deg, IR/ADD to level of T8 symmetrical  (R) shoulder strength: flex 5/5, ext 5/5, abd 5/5, IR 5/5, ER 4+/5  (R) scap strength: SA 4/5, MT 4+/5, LT 4-/5  Pain: (A) 0    Goals for this period include:  1. Pt will increase score on the FOTO to > or = 68/100 to demo an increase in functional activity tolerance. -Goal met; inc to 82/100 from 61/100 at IE  2. Pt will have an increase in R GHJ AROM to > or = T7 FIR degrees to improve reaching, dressing, self-care -Goal near met; inc to T8 from T10 at IE  3. Pt will be able to self-manage ave pain to < or = 2/10 to restore ease of movement, self-care, work. -Goal met; pt notes ave 0/10 pain  4.  Pt will have centralization of sxs to proximal to the shoulder to improve ease of sleeping and ADLs. - Goal met; pt notes full centralization and abolishment of sx    New Goals to be achieved in __4__  treatments:  1. Pt will have an increase in [address] AROM to > or = T7 FIR degrees to improve reaching, dressing, self-care   2. Pt will rate a +5 on the GROC to demo increase in functional activity tolerance      G-Codes: Carry  Current  CI= 1-19%    Goal  CJ= 20-39%. The severity rating is based on the FOTO Score  RECOMMENDATIONS  Recommend con't with PT 1x/week for 4 weeks. If you have any questions/comments please contact us directly at [phone]. Thank you for allowing us to assist in the care of your patient. Therapist Signature: Anupama Parker DPT Date: 8/20/2018   Reporting Period: 7/19/18 to 8/16/18  Time: 7:27 AM   NOTE TO PHYSICIAN:  PLEASE COMPLETE THE ORDERS BELOW AND FAX TO   [facility]: [phone]. If you are unable to process this request in 24 hours please contact our office: [phone].  ___ I have read the above report and request that my patient continue as recommended.   ___ I have read the above report and request that my patient continue therapy with the following changes/special instructions:_________________________________________________________   ___ I have read the above report and request that my patient be discharged from therapy.      Physician Signature:        Date:       Time:

## 2018-08-23 ENCOUNTER — HOSPITAL ENCOUNTER (OUTPATIENT)
Dept: PHYSICAL THERAPY | Age: 58
End: 2018-08-23
Payer: MEDICARE

## 2018-08-27 ENCOUNTER — HOSPITAL ENCOUNTER (OUTPATIENT)
Dept: PHYSICAL THERAPY | Age: 58
End: 2018-08-27
Payer: MEDICARE

## 2018-08-30 ENCOUNTER — APPOINTMENT (OUTPATIENT)
Dept: PHYSICAL THERAPY | Age: 58
End: 2018-08-30
Payer: MEDICARE

## 2018-09-24 NOTE — PROGRESS NOTES
[address] [facility] [address] [facility] [address] [place], [place] [address]  Phone: [phone] Fax: [phone]  DISCHARGE SUMMARY FOR PHYSICAL THERAPY          Patient Name: Rahel Sparks : 1960   Treatment/Medical Diagnosis: Right shoulder pain [M25.511]   Onset Date: 5 months before IE    Referral Source: [facility], * Start of Care [facility]): 18   Prior Hospitalization: See Medical History Provider #: [id number]   Prior Level of Function: WNL, Independent   Comorbidities: DM, RA, CVA 2017; TIA's, HTN, hyperthyroidism, asthma   Medications: Verified on Patient Summary List   Visits from [facility]: 8 Missed Visits: 7     Goal/Measure of Progress Goal Met? 1. Pt will have an increase in [address] AROM to > or = T7 FIR degrees to improve reaching, dressing, self-care   Status at last Eval: t10 at IE Current Status: T8, symmetrical (B)  Progressing well    2. Pt will rate a +5 on the GROC to demo increase in functional activity tolerance   Status at last Eval: NT  Current Status: +7 yes   3. Pt will increase score on the FOTO to > or = 68/100 to demo an increase in functional activity tolerance   Status at last Eval: 61/100 Current Status: 82/100 yes   4. Pt will be able to self-manage ave pain to < or = 2/10 to restore ease of movement, self-care, work. Status at last Eval: 3-10/10 Current Status: 0/10 ave yes     Key Functional Changes/Progress:   Pt is a 66 yo female with injury to the R shoulder with heavy lifting 5 months ago. Ther ex including strengthening, ROM, flexibility, stabilization; manual therapy including: DTM, mechanical traction,  Patient education; HEP, postural education, repeated movements     Pt made excellent progress in therapy. Pain is 0/10 ave, and pt rates 70% improvement. Score on the FOTO has improved from 61/100 at IE to 82/100 demonstrating significant functional improvements. She notes minimal radicular sxs into the R UE.    Improvements: neck mobility, shoulder ROM, reaching OH and behind back, lifting, carrying, ADLs  Deficits: OH lifting, intermittent N/T into the thumb  (R) shoulder AROM: flex/ext/abd [email or web address] 70deg, IR/ADD to level of T8 symmetrical  (R) shoulder strength: flex 5/5, ext 5/5, abd 5/5, IR 5/5, ER 4+/5  (R) scap strength: SA 4/5, MT 4+/5, LT 4-/5  Pain: (A) 0  G-Codes (GP): Carry  [id number] Goal  CJ= 20-39%   D/C  CI= 1-19%. The severity rating is based on the FOTO Score  Assessments/Recommendations: Discontinue therapy. Progressing towards or have reached established goals. Also pt was to con't with PT 1x/week for 3 more sessions but she did not return. If you have any questions/comments please contact us directly at [phone]   Thank you for allowing us to assist in the care of your patient.   Therapist Signature: Araceli Ordonez DPT Date: 9/24/18   Reporting Period: 8/16/18 to 8/20/18 Time: 8:48 AM

## 2018-11-29 DIAGNOSIS — E11.9 TYPE 2 DIABETES MELLITUS WITHOUT COMPLICATION, WITH LONG-TERM CURRENT USE OF INSULIN (HCC): ICD-10-CM

## 2018-11-29 DIAGNOSIS — E78.5 HYPERLIPIDEMIA, UNSPECIFIED HYPERLIPIDEMIA TYPE: ICD-10-CM

## 2018-11-29 DIAGNOSIS — Z79.4 TYPE 2 DIABETES MELLITUS WITHOUT COMPLICATION, WITH LONG-TERM CURRENT USE OF INSULIN (HCC): ICD-10-CM

## 2018-11-29 RX ORDER — ASPIRIN 325 MG
TABLET ORAL
Qty: 90 TAB | Refills: 2 | Status: SHIPPED | OUTPATIENT
Start: 2018-11-29 | End: 2019-01-04 | Stop reason: SDUPTHER

## 2019-01-04 DIAGNOSIS — Z79.4 TYPE 2 DIABETES MELLITUS WITHOUT COMPLICATION, WITH LONG-TERM CURRENT USE OF INSULIN (HCC): ICD-10-CM

## 2019-01-04 DIAGNOSIS — E11.9 TYPE 2 DIABETES MELLITUS WITHOUT COMPLICATION, WITH LONG-TERM CURRENT USE OF INSULIN (HCC): ICD-10-CM

## 2019-01-04 DIAGNOSIS — E78.5 HYPERLIPIDEMIA, UNSPECIFIED HYPERLIPIDEMIA TYPE: ICD-10-CM

## 2019-01-04 RX ORDER — ASPIRIN 325 MG
TABLET ORAL
Qty: 30 TAB | Refills: 0 | Status: SHIPPED | OUTPATIENT
Start: 2019-01-04

## 2019-01-04 RX ORDER — ASPIRIN 325 MG
TABLET, DELAYED RELEASE (ENTERIC COATED) ORAL
Qty: 30 TAB | Refills: 0 | OUTPATIENT
Start: 2019-01-04

## 2019-01-04 RX ORDER — AMLODIPINE BESYLATE 5 MG/1
5 TABLET ORAL DAILY
Qty: 90 TAB | Refills: 1 | OUTPATIENT
Start: 2019-01-04

## 2019-01-04 RX ORDER — HYDROCHLOROTHIAZIDE 25 MG/1
25 TABLET ORAL DAILY
Qty: 90 TAB | Refills: 1 | OUTPATIENT
Start: 2019-01-04

## 2019-01-04 NOTE — TELEPHONE ENCOUNTER
Denied:   Requested Prescriptions     Refused Prescriptions Disp Refills    aspirin delayed-release 325 mg tablet [Pharmacy Med Name: ANTONIO ASPIRIN  MG TABLET] 30 Tab 0     Sig: take 1 tablet by mouth once daily     Refused By: Heike Luna     Reason for Refusal: other     Duplicate order. Already filled today.

## 2019-01-04 NOTE — TELEPHONE ENCOUNTER
Requested Prescriptions     Pending Prescriptions Disp Refills    hydroCHLOROthiazide (HYDRODIURIL) 25 mg tablet 90 Tab 1     Sig: Take 1 Tab by mouth daily.  amLODIPine (NORVASC) 5 mg tablet 90 Tab 1     Sig: Take 1 Tab by mouth daily.     aspirin (ASPIRIN) 325 mg tablet 90 Tab 2

## 2019-01-04 NOTE — LETTER
1/7/2019 10:51 AM 
 
Ms. Evy Sousa [phone] I hope this letter finds you well. Our office have attempted to contact you on several occasions unsuccessfully. Please contact our office at the number listed above to schedule an appointment. As always, Your good health is important to us and our goal is to be your partner in life-long wellness. Sincerely, Shauna Vega MD

## 2019-01-07 NOTE — TELEPHONE ENCOUNTER
Attempted to contact patient this morning. No answer; contact number listed is invalid. Letter mailed to patient to return call to office to schedule appointment.

## 2019-02-18 ENCOUNTER — OFFICE VISIT (OUTPATIENT)
Dept: INTERNAL MEDICINE CLINIC | Age: 59
End: 2019-02-18

## 2019-02-18 ENCOUNTER — HOSPITAL ENCOUNTER (OUTPATIENT)
Dept: LAB | Age: 59
Discharge: HOME | End: 2019-02-18
Payer: MEDICARE

## 2019-02-18 VITALS
OXYGEN SATURATION: 99 % | TEMPERATURE: 97.4 F | RESPIRATION RATE: 16 BRPM | BODY MASS INDEX: 35.44 KG/M2 | SYSTOLIC BLOOD PRESSURE: 116 MMHG | HEART RATE: 81 BPM | HEIGHT: 63 IN | DIASTOLIC BLOOD PRESSURE: 92 MMHG | WEIGHT: 200 LBS

## 2019-02-18 DIAGNOSIS — Z79.4 TYPE 2 DIABETES MELLITUS WITHOUT COMPLICATION, WITH LONG-TERM CURRENT USE OF INSULIN (HCC): ICD-10-CM

## 2019-02-18 DIAGNOSIS — E11.9 TYPE 2 DIABETES MELLITUS WITHOUT COMPLICATION, WITH LONG-TERM CURRENT USE OF INSULIN (HCC): ICD-10-CM

## 2019-02-18 DIAGNOSIS — G45.9 TRANSIENT CEREBRAL ISCHEMIA, UNSPECIFIED TYPE: ICD-10-CM

## 2019-02-18 DIAGNOSIS — I10 HYPERTENSION, UNSPECIFIED TYPE: ICD-10-CM

## 2019-02-18 DIAGNOSIS — R09.81 SINUS CONGESTION: Primary | ICD-10-CM

## 2019-02-18 DIAGNOSIS — J43.2 CENTRILOBULAR EMPHYSEMA (HCC): ICD-10-CM

## 2019-02-18 DIAGNOSIS — Z12.11 SCREENING FOR COLON CANCER: ICD-10-CM

## 2019-02-18 LAB
ALBUMIN SERPL-MCNC: 4.2 G/DL (ref 3.4–5)
ALBUMIN/GLOB SERPL: 1.1 {RATIO} (ref 0.8–1.7)
ALP SERPL-CCNC: 166 U/L (ref 45–117)
ALT SERPL-CCNC: 28 U/L (ref 13–56)
ANION GAP SERPL CALC-SCNC: 12 MMOL/L (ref 3–18)
AST SERPL-CCNC: 18 U/L (ref 15–37)
BILIRUB SERPL-MCNC: 0.5 MG/DL (ref 0.2–1)
BUN SERPL-MCNC: 17 MG/DL (ref 7–18)
BUN/CREAT SERPL: 22 (ref 12–20)
CALCIUM SERPL-MCNC: 10.1 MG/DL (ref 8.5–10.1)
CHLORIDE SERPL-SCNC: 104 MMOL/L (ref 100–108)
CO2 SERPL-SCNC: 22 MMOL/L (ref 21–32)
CREAT SERPL-MCNC: 0.78 MG/DL (ref 0.6–1.3)
EST. AVERAGE GLUCOSE BLD GHB EST-MCNC: 157 MG/DL
GLOBULIN SER CALC-MCNC: 3.7 G/DL (ref 2–4)
GLUCOSE SERPL-MCNC: 93 MG/DL (ref 74–99)
HBA1C MFR BLD: 7.1 % (ref 4.2–5.6)
POTASSIUM SERPL-SCNC: 5 MMOL/L (ref 3.5–5.5)
PROT SERPL-MCNC: 7.9 G/DL (ref 6.4–8.2)
SODIUM SERPL-SCNC: 138 MMOL/L (ref 136–145)
TSH SERPL DL<=0.05 MIU/L-ACNC: 0.4 UIU/ML (ref 0.36–3.74)

## 2019-02-18 PROCEDURE — 83036 HEMOGLOBIN GLYCOSYLATED A1C: CPT

## 2019-02-18 PROCEDURE — 84443 ASSAY THYROID STIM HORMONE: CPT

## 2019-02-18 PROCEDURE — 82043 UR ALBUMIN QUANTITATIVE: CPT

## 2019-02-18 PROCEDURE — 80053 COMPREHEN METABOLIC PANEL: CPT

## 2019-02-18 PROCEDURE — 36415 COLL VENOUS BLD VENIPUNCTURE: CPT

## 2019-02-18 RX ORDER — AMLODIPINE BESYLATE 5 MG/1
5 TABLET ORAL DAILY
Qty: 90 TAB | Refills: 1 | Status: SHIPPED | OUTPATIENT
Start: 2019-02-18

## 2019-02-18 RX ORDER — LANCETS
EACH MISCELLANEOUS
Qty: 100 EACH | Refills: 6 | Status: SHIPPED | OUTPATIENT
Start: 2019-02-18

## 2019-02-18 RX ORDER — INSULIN GLARGINE 100 [IU]/ML
20 INJECTION, SOLUTION SUBCUTANEOUS DAILY
Qty: 5 PEN | Refills: 3 | Status: SHIPPED | OUTPATIENT
Start: 2019-02-18

## 2019-02-18 RX ORDER — CLOPIDOGREL BISULFATE 75 MG/1
75 TABLET ORAL DAILY
Qty: 90 TAB | Refills: 1 | Status: SHIPPED | OUTPATIENT
Start: 2019-02-18 | End: 2019-09-03 | Stop reason: SDUPTHER

## 2019-02-18 RX ORDER — PEN NEEDLE, DIABETIC 30 GX3/16"
NEEDLE, DISPOSABLE MISCELLANEOUS
Qty: 1 PACKAGE | Refills: 11 | Status: SHIPPED | OUTPATIENT
Start: 2019-02-18

## 2019-02-18 RX ORDER — CETIRIZINE HYDROCHLORIDE, PSEUDOEPHEDRINE HYDROCHLORIDE 5; 120 MG/1; MG/1
1 TABLET, FILM COATED, EXTENDED RELEASE ORAL 2 TIMES DAILY
Qty: 24 TAB | Refills: 2 | Status: SHIPPED | OUTPATIENT
Start: 2019-02-18

## 2019-02-18 RX ORDER — HYDROCHLOROTHIAZIDE 25 MG/1
25 TABLET ORAL DAILY
Qty: 90 TAB | Refills: 1 | Status: SHIPPED | OUTPATIENT
Start: 2019-02-18

## 2019-02-18 NOTE — PROGRESS NOTES
ROOM # 2    Maria Isabel Carrion presents today for   Chief Complaint   Patient presents with    Cold Symptoms     afebrile    Nasal Congestion    Ear Fullness     right ear       Erna Shelley preferred language for health care discussion is english/other. Is someone accompanying this pt? no    Is the patient using any DME equipment during OV? no    Depression Screening:  3 most recent [facility] Screens 6/25/2018 6/25/2018 3/1/2018 12/21/2017 11/27/2017   Little interest or pleasure in doing things Not at all Not at all Not at all Several days Not at all   Feeling down, depressed, irritable, or hopeless Not at all Not at all Not at all Several days Not at all   Total Score PHQ 2 0 0 0 2 0       Learning Assessment:  Learning Assessment 11/27/2017   PRIMARY LEARNER Patient   HIGHEST LEVEL OF EDUCATION - PRIMARY LEARNER  SOME COLLEGE   PRIMARY LANGUAGE ENGLISH   LEARNER PREFERENCE PRIMARY DEMONSTRATION   ANSWERED BY patient   RELATIONSHIP SELF       Abuse Screening:  Abuse Screening Questionnaire 6/25/2018   Do you ever feel afraid of your partner? N   Are you in a relationship with someone who physically or mentally threatens you? N   Is it safe for you to go home? Y       Fall Risk  No flowsheet data found. Health Maintenance reviewed and discussed per provider.  Yes    Maria Isabel Carrion is due for   Health Maintenance Due   Topic Date Due    Hepatitis C Screening  1960    LIPID PANEL Q1  1960    FOOT EXAM Q1  07/02/1972    EYE EXAM RETINAL OR DILATED  07/02/1972    Pneumococcal 19-64 Medium Risk (1 of 1 - PPSV23) 07/02/1981    DTaP/Tdap/Td series (1 - Tdap) 07/02/1983    PAP AKA CERVICAL CYTOLOGY  07/02/1983    Shingrix Vaccine Age 50> (1 of 2) 07/02/2012    BREAST CANCER SCRN MAMMOGRAM  07/02/2012    FOBT Q 1 YEAR AGE 50-75  07/02/2012    MEDICARE YEARLY EXAM  03/14/2018    Influenza Age 9 to Adult  08/01/2018    HEMOGLOBIN A1C Q6M  12/18/2018    HM to be d/w provider      Please order/place referral if appropriate. Advance Directive:  1. Do you have an advance directive in place? Patient Reply: no    2. If not, would you like material regarding how to put one in place? Patient Reply: no    Coordination of Care:  1. Have you been to the ER, urgent care clinic since your last visit? Hospitalized since your last visit? no    2. Have you seen or consulted any other health care providers outside of the [address] [place] [address] since your last visit? Include any pap smears or colon screening.  no

## 2019-02-18 NOTE — PROGRESS NOTES
FAMILY MEDICINE CLINIC NOTE    S: The patient presents with a complaint of sinus congestion for the last few days. She notes cough productive of yellowish sputum which is worse at night. She denies fever or sick contacts    She has recently been seen by her pulmonologist in January 22. CT of the chest was ordered to evaluate centrolobular emphysema but denied by insurance. Patient has follow up with pulmonology in June 2019. She needs a screening colonoscopy. She has been adherent amlodipine and HCTZ. She denies any adverse effects. She needs a refill of plavix. She has been adherent with lantus 20 units daily for her diabetes, no hypoglycemic episodes. Lab Results   Component Value Date/Time    Hemoglobin A1c 7.9 (H) 06/18/2018 03:20 PM       She has recently applied for home health aide. She needs assistance with household cleaning and driving to appointments. She has difficulty with these tasks due to her emphysema. Patient denies angina, dyspnea, palpitations or edema    Current Outpatient Medications on File Prior to Visit   Medication Sig Dispense Refill    aspirin (ASPIRIN) 325 mg tablet take 1 tablet by mouth once daily 30 Tab 0    glucose blood VI test strips (ONETOUCH ULTRA TEST) strip Use to test blood sugar daily 100 Strip 1    cyclobenzaprine (FLEXERIL) 5 mg tablet Take 1 Tab by mouth two (2) times daily as needed for Muscle Spasm(s). 15 Tab 0    atorvastatin (LIPITOR) 40 mg tablet Take 1 Tab by mouth daily. 90 Tab 1    fluticasone (FLONASE) 50 mcg/actuation nasal spray 2 Sprays by Both Nostrils route daily. 1 Bottle 2    albuterol (PROAIR HFA) 90 mcg/actuation inhaler PROAIR  (90 Base) MCG/ACT AERS      buPROPion SR (WELLBUTRIN SR) 150 mg SR tablet 150 mg.      esomeprazole (NEXIUM) 40 mg capsule 40 mg.      albuterol (PROVENTIL HFA, VENTOLIN HFA, PROAIR HFA) 90 mcg/actuation inhaler Take 2 Puffs by inhalation every four (4) hours as needed for Wheezing.  1 Inhaler 3  inhalational spacing device Use every time you use your inhaler 1 Device 0    pseudoephedrine (SUDAFED) 60 mg tablet Take 1 Tab by mouth every six (6) hours as needed for Congestion. 40 Tab 0    Lancing Device (AUTO-LANCET MINI) misc Use to hold lancets 1 Each 0    lidocaine (LIDODERM) 5 % Apply 1 patch as directed for 12 hours every 24 hours (12 hours on, 12 hours off)      nystatin (MYCOSTATIN) 100,000 unit/mL suspension        No current facility-administered medications on file prior to visit. Past Medical History:   Diagnosis Date    Arthritis     rheumatoid    Diabetes [facility] [address]     Emphysema lung ([facility] [address] [facility]     Hypertension        Social History     Socioeconomic History    Marital status: LEGALLY      Spouse name: Not on file    Number of children: Not on file    Years of education: Not on file    Highest education level: Not on file   Social Needs    Financial resource strain: Not on file    Food insecurity - worry: Not on file    Food insecurity - inability: Not on file   [employer] needs - medical: Not on file   [employer] needs - non-medical: Not on file   Occupational History    Not on file   Tobacco Use    Smoking status: Current Every Day Smoker     Packs/day: 1.00    Smokeless tobacco: Never Used   Substance and Sexual Activity    Alcohol use:  Yes    Drug use: No    Sexual activity: Yes     Partners: Male   Other Topics Concern    Not on file   Social History Narrative    Not on file       Family History   Problem Relation Age of Onset    Cancer Mother         lung and breast    No Known Problems Father        O:  Visit Vitals  BP (!) 116/92 (BP 1 Location: Left arm, BP Patient Position: Sitting)   Pulse 81   Temp 97.4 °F (36.3 °C) (Oral)   Resp 16   Ht 5' 3\" (1.6 m)   Wt 200 lb (90.7 kg)   SpO2 99%   BMI 35.43 kg/m²     NAD, comfortable  No LAD  Erythema of the posterior oropharynx  Clear TM bilat  RRR, no murmurs  CTABL, no wheezing/ronchi/rales  No edema    66 y.o. female      ICD-10-CM ICD-9-CM    1. Sinus congestion R09.81 478.19 cetirizine-psuedoePHEDrine (ZYRTEC-D) 5-120 mg per tablet   2. Type 2 diabetes mellitus without complication, with long-term current use of insulin (HCC) E11.9 250.00 insulin glargine (LANTUS,BASAGLAR) 100 unit/mL (3 mL) inpn    Z79.4 V58.67 lancets misc      glucose blood VI test strips (FREESTYLE TEST) strip      Insulin Needles, Disposable, 31 gauge x 5/16\" ndle      HEMOGLOBIN A1C WITH EAG      TSH 3RD GENERATION      MICROALBUMIN, UR, RAND W/ MICROALB/CREAT RATIO      METABOLIC PANEL, COMPREHENSIVE  Follow up with endocrinology   3. Transient cerebral ischemia, unspecified type G45.9 435.9 clopidogrel (PLAVIX) 75 mg tab   4. Centrilobular emphysema (HCC) J43.2 492.8 CT CHEST W CONT    Follow up with pulmonology    5. Screening for colon cancer Z12.11 V76.51 REFERRAL TO GASTROENTEROLOGY   6.  Hypertension, unspecified type I10 401.9 amLODIPine (NORVASC) 5 mg tablet      hydroCHLOROthiazide (HYDRODIURIL) 25 mg tablet      TSH 3RD GENERATION      METABOLIC PANEL, COMPREHENSIVE     Follow up in 6 months

## 2019-02-19 LAB
CREAT UR-MCNC: 166 MG/DL (ref 30–125)
MICROALBUMIN UR-MCNC: 1.74 MG/DL (ref 0–3)
MICROALBUMIN/CREAT UR-RTO: 10 MG/G (ref 0–30)

## 2019-02-19 NOTE — PROGRESS NOTES
Attempted to contact pt at  number, no answer. Lvm for pt to return call to office at [phone]. Will continue to try to contact pt.

## 2019-02-19 NOTE — PROGRESS NOTES
Please call this patient. Inform them that her sugars are improving, for now we will stay at her current insulin dosage.      Thank you     Dr. Joan Martinez

## 2019-03-12 ENCOUNTER — TELEPHONE (OUTPATIENT)
Dept: INTERNAL MEDICINE CLINIC | Age: 59
End: 2019-03-12

## 2019-03-12 RX ORDER — ALBUTEROL SULFATE 0.83 MG/ML
2.5 SOLUTION RESPIRATORY (INHALATION)
Qty: 24 EACH | Refills: 2 | Status: SHIPPED | OUTPATIENT
Start: 2019-03-12 | End: 2019-03-15

## 2019-03-12 NOTE — TELEPHONE ENCOUNTER
Patient is requesting to have albuterol nebulizer solution filled she is out and in need of her medication please send to patients pharmacy

## 2019-03-13 ENCOUNTER — TELEPHONE (OUTPATIENT)
Dept: INTERNAL MEDICINE CLINIC | Age: 59
End: 2019-03-13

## 2019-03-13 NOTE — TELEPHONE ENCOUNTER
Pharmacist is calling stating they need a Dx code to go with the Albuterol Nebulizer solution so it can be billed to medicare.     [facility] [phone]

## 2019-03-15 DIAGNOSIS — J43.9 PULMONARY EMPHYSEMA, UNSPECIFIED EMPHYSEMA TYPE (HCC): Primary | ICD-10-CM

## 2019-03-15 RX ORDER — ALBUTEROL SULFATE 0.83 MG/ML
2.5 SOLUTION RESPIRATORY (INHALATION)
Qty: 24 EACH | Refills: 2 | Status: SHIPPED | OUTPATIENT
Start: 2019-03-15

## 2019-03-15 NOTE — TELEPHONE ENCOUNTER
Called [facility] and they stated dx code must be on albuterol script per medicare part d. [facility] faxed over a form that must be done by  Dx code cant be given over the phone per Medicare policy.

## 2019-03-18 ENCOUNTER — OFFICE VISIT (OUTPATIENT)
Dept: INTERNAL MEDICINE CLINIC | Age: 59
End: 2019-03-18

## 2019-03-18 VITALS
RESPIRATION RATE: 18 BRPM | DIASTOLIC BLOOD PRESSURE: 68 MMHG | HEART RATE: 67 BPM | BODY MASS INDEX: 36.32 KG/M2 | SYSTOLIC BLOOD PRESSURE: 101 MMHG | OXYGEN SATURATION: 97 % | WEIGHT: 205 LBS | TEMPERATURE: 97 F | HEIGHT: 63 IN

## 2019-03-18 DIAGNOSIS — H92.01 RIGHT EAR PAIN: Primary | ICD-10-CM

## 2019-03-18 DIAGNOSIS — I10 ESSENTIAL HYPERTENSION: ICD-10-CM

## 2019-03-18 DIAGNOSIS — G89.29 CHRONIC PAIN OF RIGHT ANKLE: ICD-10-CM

## 2019-03-18 DIAGNOSIS — M25.571 CHRONIC PAIN OF RIGHT ANKLE: ICD-10-CM

## 2019-03-18 DIAGNOSIS — F17.200 TOBACCO DEPENDENCE: ICD-10-CM

## 2019-03-18 RX ORDER — AZITHROMYCIN 250 MG/1
TABLET, FILM COATED ORAL
Qty: 6 TAB | Refills: 0 | Status: SHIPPED | OUTPATIENT
Start: 2019-03-18 | End: 2019-03-23

## 2019-03-18 RX ORDER — IBUPROFEN 800 MG/1
800 TABLET ORAL
Qty: 100 TAB | Refills: 5 | Status: SHIPPED | OUTPATIENT
Start: 2019-03-18

## 2019-03-18 NOTE — PATIENT INSTRUCTIONS
Stopping Smoking: Care Instructions  Your Care Instructions  Cigarette smokers crave the nicotine in cigarettes. Giving it up is much harder than simply changing a habit. Your body has to stop craving the nicotine. It is hard to quit, but you can do it. There are many tools that people use to quit smoking. You may find that combining tools works best for you. There are several steps to quitting. First you get ready to quit. Then you get support to help you. After that, you learn new skills and behaviors to become a nonsmoker. For many people, a necessary step is getting and using medicine. Your doctor will help you set up the plan that best meets your needs. You may want to attend a smoking cessation program to help you quit smoking. When you choose a program, look for one that has proven success. Ask your doctor for ideas. You will greatly increase your chances of success if you take medicine as well as get counseling or join a cessation program.  Some of the changes you feel when you first quit tobacco are uncomfortable. Your body will miss the nicotine at first, and you may feel short-tempered and grumpy. You may have trouble sleeping or concentrating. Medicine can help you deal with these symptoms. You may struggle with changing your smoking habits and rituals. The last step is the tricky one: Be prepared for the smoking urge to continue for a time. This is a lot to deal with, but keep at it. You will feel better. Follow-up care is a key part of your treatment and safety. Be sure to make and go to all appointments, and call your doctor if you are having problems. It's also a good idea to know your test results and keep a list of the medicines you take. How can you care for yourself at home? · Ask your family, friends, and coworkers for support. You have a better chance of quitting if you have help and support.   · Join a support group, such as Nicotine Anonymous, for people who are trying to quit smoking. · Consider signing up for a smoking cessation program, such as the American Lung Association's Freedom from Smoking program.  · Get text messaging support. Go to the website at www.smokefree. gov to sign up for the [facility] program.  · Set a quit date. Pick your date carefully so that it is not right in the middle of a big deadline or stressful time. Once you quit, do not even take a puff. Get rid of all ashtrays and lighters after your last cigarette. Clean your house and your clothes so that they do not smell of smoke. · Learn how to be a nonsmoker. Think about ways you can avoid those things that make you reach for a cigarette. ? Avoid situations that put you at greatest risk for smoking. For some people, it is hard to have a drink with friends without smoking. For others, they might skip a coffee break with coworkers who smoke. ? Change your daily routine. Take a different route to work or eat a meal in a different place. · Cut down on stress. Calm yourself or release tension by doing an activity you enjoy, such as reading a book, taking a hot bath, or gardening. · Talk to your doctor or pharmacist about nicotine replacement therapy, which replaces the nicotine in your body. You still get nicotine but you do not use tobacco. Nicotine replacement products help you slowly reduce the amount of nicotine you need. These products come in several forms, many of them available over-the-counter:  ? Nicotine patches  ? Nicotine gum and lozenges  ? Nicotine inhaler  · Ask your doctor about bupropion (Wellbutrin) or varenicline (Chantix), which are prescription medicines. They do not contain nicotine. They help you by reducing withdrawal symptoms, such as stress and anxiety. · Some people find hypnosis, acupuncture, and massage helpful for ending the smoking habit. · Eat a healthy diet and get regular exercise. Having healthy habits will help your body move past its craving for nicotine.   · Be prepared to keep trying. Most people are not successful the first few times they try to quit. Do not get mad at yourself if you smoke again. Make a list of things you learned and think about when you want to try again, such as next week, next month, or next year. Where can you learn more? Go to http://polly-dixie.info/. Enter M721 in the search box to learn more about \"Stopping Smoking: Care Instructions. \"  Current as of: September 26, 2018  Content Version: 11.9  © 0750-1192 [employer]. Care instructions adapted under license by [employer] (which disclaims liability or warranty for this information). If you have questions about a medical condition or this instruction, always ask your healthcare professional. [address] any warranty or liability for your use of this information. Saline Nasal Washes: Care Instructions  Your Care Instructions  Saline nasal washes help keep the nasal passages open by washing out thick or dried mucus. This simple remedy can help relieve symptoms of allergies, sinusitis, and colds. It also can make the nose feel more comfortable by keeping the mucous membranes moist. You may notice a little burning sensation in your nose the first few times you use the solution, but this usually gets better in a few days. Follow-up care is a key part of your treatment and safety. Be sure to make and go to all appointments, and call your doctor if you are having problems. It's also a good idea to know your test results and keep a list of the medicines you take. How can you care for yourself at home? · You can buy premixed saline solution in a squeeze bottle or other sinus rinse products at a drugstore. Read and follow the instructions on the label. · You also can make your own saline solution by adding 1 teaspoon of salt and 1 teaspoon of baking soda to 2 cups of distilled water.   · If you use a homemade solution, pour a small amount into a clean bowl. Using a rubber bulb syringe, squeeze the syringe and place the tip in the salt water. Pull a small amount of the salt water into the syringe by relaxing your hand. · Sit down with your head tilted slightly back. Do not lie down. Put the tip of the bulb syringe or the squeeze bottle a little way into one of your nostrils. Gently drip or squirt a few drops into the nostril. Repeat with the other nostril. Some sneezing and gagging are normal at first.  · Gently blow your nose. · Wipe the syringe or bottle tip clean after each use. · Repeat this 2 or 3 times a day. · Use nasal washes gently if you have nosebleeds often. When should you call for help? Watch closely for changes in your health, and be sure to contact your doctor if:    · You often get nosebleeds.     · You have problems doing the nasal washes. Where can you learn more? Go to http://polly-dixie.info/. Enter [id number] [id number] [id number] in the search box to learn more about \"Saline Nasal Washes: Care Instructions. \"  Current as of: March 27, 2018  Content Version: 11.9  © 7200-0854 [employer]. Care instructions adapted under license by [employer] (which disclaims liability or warranty for this information). If you have questions about a medical condition or this instruction, always ask your healthcare professional. [place] [address] any warranty or liability for your use of this information.

## 2019-03-18 NOTE — PROGRESS NOTES
HISTORY OF PRESENT ILLNESS  Gael Alfred is a 66 y.o. female. 67 yo female with c/o R ear discomfort, increases since sinusitis sx last month. Those sx are better but ear pain persists. No drainage. + trouble hearing. No fevers. HTN: BP currently well controlled. Notes occasional elevation of DBP  C/o increased R ankle pain. Notes h/o RA but has not seen rheum recently. Did take ibuprofen which helped. Prior cocaine use but clean 10 years  Continues to smoke but is reducing. Has been as high as 1.5 ppd. Using ecigarette in attempts to quit. Ear Fullness    Associated symptoms include hearing loss. Pertinent negatives include no ear discharge, no headaches, no vomiting, no cough and no rash. Medication Evaluation   Pertinent negatives include no chest pain, no headaches and no shortness of breath. Review of Systems   Constitutional: Negative for chills, fever and weight loss. HENT: Positive for ear pain and hearing loss. Negative for congestion and ear discharge. Eyes: Negative for blurred vision and pain. Respiratory: Negative for cough and shortness of breath. Cardiovascular: Negative for chest pain, palpitations and leg swelling. Gastrointestinal: Negative for nausea and vomiting. Genitourinary: Negative for frequency and urgency. Musculoskeletal: Positive for joint pain. Negative for myalgias. Skin: Negative for itching and rash. Neurological: Negative for dizziness, tingling and headaches. Psychiatric/Behavioral: Negative for depression. The patient is not nervous/anxious. Past Medical History:   Diagnosis Date    Arthritis     rheumatoid    [facility] [address]     Emphysema lung ([facility] [address]     Hypertension      Current Outpatient Medications on File Prior to Visit   Medication Sig Dispense Refill    albuterol (PROVENTIL VENTOLIN) 2.5 mg /3 mL (0.083 %) nebulizer solution 3 mL by Nebulization route every four (4) hours as needed for Wheezing.  Dx: Emphysema lung ([address] [J43.9] 24 Each 2    amLODIPine (NORVASC) 5 mg tablet Take 1 Tab by mouth daily. 90 Tab 1    clopidogrel (PLAVIX) 75 mg tab Take 1 Tab by mouth daily. 90 Tab 1    insulin glargine (LANTUS,BASAGLAR) 100 unit/mL (3 mL) inpn 20 Units by SubCUTAneous route daily. 5 Pen 3    hydroCHLOROthiazide (HYDRODIURIL) 25 mg tablet Take 1 Tab by mouth daily. 90 Tab 1    lancets misc Check sugars once a day 100 Each 6    glucose blood VI test strips (FREESTYLE TEST) strip Use to test blood sugar 1-2 times per day 100 Strip 3    Insulin Needles, Disposable, 31 gauge x 5/16\" ndle Use to inject insulin once daily 1 Package 11    aspirin (ASPIRIN) 325 mg tablet take 1 tablet by mouth once daily 30 Tab 0    atorvastatin (LIPITOR) 40 mg tablet Take 1 Tab by mouth daily. 90 Tab 1    fluticasone (FLONASE) 50 mcg/actuation nasal spray 2 Sprays by Both Nostrils route daily. 1 Bottle 2    albuterol (PROAIR HFA) 90 mcg/actuation inhaler PROAIR  (90 Base) MCG/ACT AERS      buPROPion SR (WELLBUTRIN SR) 150 mg SR tablet 150 mg.      albuterol (PROVENTIL HFA, VENTOLIN HFA, PROAIR HFA) 90 mcg/actuation inhaler Take 2 Puffs by inhalation every four (4) hours as needed for Wheezing. 1 Inhaler 3    inhalational spacing device Use every time you use your inhaler 1 Device 0    Lancing Device (AUTO-LANCET MINI) misc Use to hold lancets 1 Each 0    cetirizine-psuedoePHEDrine (ZYRTEC-D) 5-120 mg per tablet Take 1 Tab by mouth two (2) times a day. 24 Tab 2    glucose blood VI test strips (ONETOUCH ULTRA TEST) strip Use to test blood sugar daily 100 Strip 1    cyclobenzaprine (FLEXERIL) 5 mg tablet Take 1 Tab by mouth two (2) times daily as needed for Muscle Spasm(s).  15 Tab 0    lidocaine (LIDODERM) 5 % Apply 1 patch as directed for 12 hours every 24 hours (12 hours on, 12 hours off)      esomeprazole (NEXIUM) 40 mg capsule 40 mg.      nystatin (MYCOSTATIN) 100,000 unit/mL suspension       pseudoephedrine (SUDAFED) 60 mg tablet Take 1 Tab by mouth every six (6) hours as needed for Congestion. 40 Tab 0     No current facility-administered medications on file prior to visit. Physical Exam   Constitutional: She appears well-developed and well-nourished. No distress. /68 (BP 1 Location: Left arm, BP Patient Position: Sitting)   Pulse 67   Temp 97 °F (36.1 °C) (Oral)   Resp 18   Ht 5' 3\" (1.6 m)   Wt 205 lb (93 kg)   SpO2 97%   BMI 36.31 kg/m²      HENT:   Right Ear: Tympanic membrane is erythematous. Left Ear: Tympanic membrane is erythematous. Eyes: EOM are normal. Right eye exhibits no discharge. Left eye exhibits no discharge. No scleral icterus. Neck: Neck supple. Cardiovascular: Normal rate, regular rhythm and normal heart sounds. Exam reveals no gallop and no friction rub. No murmur heard. Pulmonary/Chest: Effort normal and breath sounds normal. No respiratory distress. She has no wheezes. She has no rales. Musculoskeletal: She exhibits no edema or tenderness. Lymphadenopathy:     She has no cervical adenopathy. Neurological: She is alert. She exhibits normal muscle tone. Skin: Skin is warm and dry. Psychiatric: She has a normal mood and affect. Lab Results   Component Value Date/Time    Sodium 138 02/18/2019 12:01 PM    Potassium 5.0 02/18/2019 12:01 PM    Chloride 104 02/18/2019 12:01 PM    CO2 22 02/18/2019 12:01 PM    Anion gap 12 02/18/2019 12:01 PM    Glucose 93 02/18/2019 12:01 PM    BUN 17 02/18/2019 12:01 PM    Creatinine 0.78 02/18/2019 12:01 PM    BUN/Creatinine ratio 22 (H) 02/18/2019 12:01 PM    GFR est AA >60 02/18/2019 12:01 PM    GFR est non-AA >60 02/18/2019 12:01 PM    Calcium 10.1 02/18/2019 12:01 PM    Bilirubin, total 0.5 02/18/2019 12:01 PM    AST (SGOT) 18 02/18/2019 12:01 PM    Alk.  phosphatase 166 (H) 02/18/2019 12:01 PM    Protein, total 7.9 02/18/2019 12:01 PM    Albumin 4.2 02/18/2019 12:01 PM    Globulin 3.7 02/18/2019 12:01 PM    A-G Ratio 1.1 02/18/2019 12:01 PM ALT (SGPT) 28 02/18/2019 12:01 PM     ASSESSMENT and PLAN    ICD-10-CM ICD-9-CM    1. Right ear pain H92.01 388.70    2. Chronic pain of right ankle M25.571 719.47     G89.29 338.29    3. Tobacco dependence F17.200 305.1    4. Essential hypertension I10 401.9    Mild TM erythema. Will treat with course of azithromycin. Discussed saline rinses, ear popper if covered by insurance  Continue ibuprofen for ankle pain. Encouraged rheum f/u given her report of RA  Discussed tobacco cessation  BP well controlled at this time.

## 2019-03-18 NOTE — PROGRESS NOTES
ROOM # [id number]    Kin Cruz presents today for   Chief Complaint   Patient presents with    Ear Fullness     right     Medication Evaluation     regarding BP       Erna Shelley preferred language for health care discussion is english/other. Is someone accompanying this pt? no    Is the patient using any DME equipment during OV? no    Depression Screening:  3 most recent [facility] Screens 6/25/2018 6/25/2018 3/1/2018 12/21/2017 11/27/2017   Little interest or pleasure in doing things Not at all Not at all Not at all Several days Not at all   Feeling down, depressed, irritable, or hopeless Not at all Not at all Not at all Several days Not at all   Total Score PHQ 2 0 0 0 2 0       Learning Assessment:  Learning Assessment 11/27/2017   PRIMARY LEARNER Patient   HIGHEST LEVEL OF EDUCATION - PRIMARY LEARNER  SOME COLLEGE   PRIMARY LANGUAGE ENGLISH   LEARNER PREFERENCE PRIMARY DEMONSTRATION   ANSWERED BY patient   RELATIONSHIP SELF       Abuse Screening:  Abuse Screening Questionnaire 6/25/2018   Do you ever feel afraid of your partner? N   Are you in a relationship with someone who physically or mentally threatens you? N   Is it safe for you to go home? Y       Fall Risk  No flowsheet data found. Health Maintenance reviewed and discussed per provider. Yes    Kin Cruz is due for   Health Maintenance Due   Topic Date Due    Hepatitis C Screening  1960    LIPID PANEL Q1  1960    FOOT EXAM Q1  07/02/1972    EYE EXAM RETINAL OR DILATED  07/02/1972    Pneumococcal 19-64 Medium Risk (1 of 1 - PPSV23) 07/02/1981    DTaP/Tdap/Td series (1 - Tdap) 07/02/1983    PAP AKA CERVICAL CYTOLOGY  07/02/1983    Shingrix Vaccine Age 50> (1 of 2) 07/02/2012    BREAST CANCER SCRN MAMMOGRAM  07/02/2012    FOBT Q 1 YEAR AGE 50-75  07/02/2012    MEDICARE YEARLY EXAM  03/14/2018    Influenza Age 9 to Adult  08/01/2018     Please order/place referral if appropriate. Advance Directive:  1.  Do you have an advance directive in place? Patient Reply: no    2. If not, would you like material regarding how to put one in place? Patient Reply: no    Coordination of Care:  1. Have you been to the ER, urgent care clinic since your last visit? Hospitalized since your last visit? yes    2. Have you seen or consulted any other health care providers outside of the [address] [place] [address] since your last visit? Include any pap smears or colon screening.  no

## 2019-06-06 DIAGNOSIS — E78.5 HYPERLIPIDEMIA, UNSPECIFIED HYPERLIPIDEMIA TYPE: ICD-10-CM

## 2019-06-10 RX ORDER — ATORVASTATIN CALCIUM 40 MG/1
TABLET, FILM COATED ORAL
Qty: 90 TAB | Refills: 1 | Status: SHIPPED | OUTPATIENT
Start: 2019-06-10

## 2019-06-19 ENCOUNTER — OFFICE VISIT (OUTPATIENT)
Dept: INTERNAL MEDICINE CLINIC | Age: 59
End: 2019-06-19

## 2019-06-19 VITALS
HEIGHT: 63 IN | BODY MASS INDEX: 36.68 KG/M2 | OXYGEN SATURATION: 92 % | WEIGHT: 207 LBS | SYSTOLIC BLOOD PRESSURE: 109 MMHG | TEMPERATURE: 96.2 F | RESPIRATION RATE: 18 BRPM | DIASTOLIC BLOOD PRESSURE: 80 MMHG | HEART RATE: 71 BPM

## 2019-06-19 DIAGNOSIS — I10 ESSENTIAL HYPERTENSION: Primary | ICD-10-CM

## 2019-06-19 DIAGNOSIS — M79.604 PAIN IN BOTH LOWER EXTREMITIES: ICD-10-CM

## 2019-06-19 DIAGNOSIS — M54.2 NECK PAIN: ICD-10-CM

## 2019-06-19 DIAGNOSIS — M79.605 PAIN IN BOTH LOWER EXTREMITIES: ICD-10-CM

## 2019-06-19 DIAGNOSIS — J43.2 CENTRILOBULAR EMPHYSEMA (HCC): ICD-10-CM

## 2019-06-19 DIAGNOSIS — R59.0 MEDIASTINAL LYMPHADENOPATHY: ICD-10-CM

## 2019-06-19 NOTE — PATIENT INSTRUCTIONS
Chronic Obstructive Pulmonary Disease (COPD): Care Instructions Your Care Instructions Chronic obstructive pulmonary disease (COPD) is a general term for a group of lung diseases, including emphysema and chronic bronchitis. People with COPD have decreased airflow in and out of the lungs, which makes it hard to breathe. The airways also can get clogged with thick mucus. Cigarette smoking is a major cause of COPD. Although there is no cure for COPD, you can slow its progress. Following your treatment plan and taking care of yourself can help you feel better and live longer. Follow-up care is a key part of your treatment and safety. Be sure to make and go to all appointments, and call your doctor if you are having problems. It's also a good idea to know your test results and keep a list of the medicines you take. How can you care for yourself at home? 
 Staying healthy 
  · Do not smoke. This is the most important step you can take to prevent more damage to your lungs. If you need help quitting, talk to your doctor about stop-smoking programs and medicines. These can increase your chances of quitting for good.  
  · Avoid colds and flu. Get a pneumococcal vaccine shot. If you have had one before, ask your doctor whether you need a second dose. Get the flu vaccine every fall. If you must be around people with colds or the flu, wash your hands often.  
  · Avoid secondhand smoke, air pollution, and high altitudes. Also avoid cold, dry air and hot, humid air. Stay at home with your windows closed when air pollution is bad.  
 Medicines and oxygen therapy 
  · Take your medicines exactly as prescribed. Call your doctor if you think you are having a problem with your medicine.  
  · You may be taking medicines such as: 
? Bronchodilators. These help open your airways and make breathing easier.  Bronchodilators are either short-acting (work for 6 to 9 hours) or long-acting (work for 24 hours). You inhale most bronchodilators, so they start to act quickly. Always carry your quick-relief inhaler with you in case you need it while you are away from home. ? Corticosteroids (prednisone, budesonide). These reduce airway inflammation. They come in pill or inhaled form. You must take these medicines every day for them to work well.  
  · A spacer may help you get more inhaled medicine to your lungs. Ask your doctor or pharmacist if a spacer is right for you. If it is, ask how to use it properly.  
  · Do not take any vitamins, over-the-counter medicine, or herbal products without talking to your doctor first.  
  · If your doctor prescribed antibiotics, take them as directed. Do not stop taking them just because you feel better. You need to take the full course of antibiotics.  
  · Oxygen therapy boosts the amount of oxygen in your blood and helps you breathe easier. Use the flow rate your doctor has recommended, and do not change it without talking to your doctor first.  
Activity 
  · Get regular exercise. Walking is an easy way to get exercise. Start out slowly, and walk a little more each day.  
  · Pay attention to your breathing. You are exercising too hard if you cannot talk while you are exercising.  
  · Take short rest breaks when doing household chores and other activities.  
  · Learn breathing methodssuch as breathing through pursed lipsto help you become less short of breath.  
  · If your doctor has not set you up with a pulmonary rehabilitation program, talk to him or her about whether rehab is right for you. Rehab includes exercise programs, education about your disease and how to manage it, help with diet and other changes, and emotional support. Diet 
  · Eat regular, healthy meals. Use bronchodilators about 1 hour before you eat to make it easier to eat.  Eat several small meals instead of three large ones. Drink beverages at the end of the meal. Avoid foods that are hard to chew.  
  · Eat foods that contain protein so that you do not lose muscle mass.  
  · Talk with your doctor if you gain too much weight or if you lose weight without trying.  
 Mental health 
  · Talk to your family, friends, or a therapist about your feelings. It is normal to feel frightened, angry, hopeless, helpless, and even guilty. Talking openly about bad feelings can help you cope. If these feelings last, talk to your doctor. When should you call for help? Call 911 anytime you think you may need emergency care. For example, call if: 
  · You have severe trouble breathing.  
 Call your doctor now or seek immediate medical care if: 
  · You have new or worse trouble breathing.  
  · You cough up blood.  
  · You have a fever.  
 Watch closely for changes in your health, and be sure to contact your doctor if: 
  · You cough more deeply or more often, especially if you notice more mucus or a change in the color of your mucus.  
  · You have new or worse swelling in your legs or belly.  
  · You are not getting better as expected. Where can you learn more? Go to http://polly-dixie.info/. Tony Hurtado in the search box to learn more about \"Chronic Obstructive Pulmonary Disease (COPD): Care Instructions. \" Current as of: September 5, 2018 Content Version: 11.9 © 2469-3810 [employer]. Care instructions adapted under license by [employer] (which disclaims liability or warranty for this information). If you have questions about a medical condition or this instruction, always ask your healthcare professional. [address] any warranty or liability for your use of this information.

## 2019-06-19 NOTE — PROGRESS NOTES
HISTORY OF PRESENT ILLNESS  Orlin Ridley is a 66 y.o. female. Here for f/u of HTN, leg, neck pain  BP controlled. No CP, SOB  Prior CT one year ago with enlarged LN, emphysema. Breathing stable  Has chronic leg discomfort. Recently with upper back, neck discomfort . Would like to try PT again as this has been helpful in the past      Review of Systems   Constitutional: Negative for chills, fever and weight loss. HENT: Negative for congestion and ear pain. Eyes: Negative for blurred vision and pain. Respiratory: Negative for cough and shortness of breath. Cardiovascular: Negative for chest pain, palpitations and leg swelling. Gastrointestinal: Negative for nausea and vomiting. Genitourinary: Negative for frequency and urgency. Musculoskeletal: Positive for joint pain, myalgias and neck pain. Skin: Negative for itching and rash. Neurological: Negative for dizziness, tingling and headaches. Psychiatric/Behavioral: Negative for depression. The patient is not nervous/anxious. Past Medical History:   Diagnosis Date    Arthritis     rheumatoid    Diabetes ([facility]     Emphysema lung ([facility]     Hypertension      Current Outpatient Medications on File Prior to Visit   Medication Sig Dispense Refill    atorvastatin (LIPITOR) 40 mg tablet take 1 tablet by mouth once daily 90 Tab 1    ibuprofen (MOTRIN) 800 mg tablet Take 1 Tab by mouth every eight (8) hours as needed for Pain. 100 Tab 5    albuterol (PROVENTIL VENTOLIN) 2.5 mg /3 mL (0.083 %) nebulizer solution 3 mL by Nebulization route every four (4) hours as needed for Wheezing. Dx: Emphysema lung ([facility] [address] [J43.9] 24 Each 2    amLODIPine (NORVASC) 5 mg tablet Take 1 Tab by mouth daily. 90 Tab 1    clopidogrel (PLAVIX) 75 mg tab Take 1 Tab by mouth daily. 90 Tab 1    insulin glargine (LANTUS,BASAGLAR) 100 unit/mL (3 mL) inpn 20 Units by SubCUTAneous route daily.  5 Pen 3    hydroCHLOROthiazide (HYDRODIURIL) 25 mg tablet Take 1 Tab by mouth daily. 90 Tab 1    glucose blood VI test strips (FREESTYLE TEST) strip Use to test blood sugar 1-2 times per day 100 Strip 3    cetirizine-psuedoePHEDrine (ZYRTEC-D) 5-120 mg per tablet Take 1 Tab by mouth two (2) times a day. 24 Tab 2    aspirin (ASPIRIN) 325 mg tablet take 1 tablet by mouth once daily 30 Tab 0    glucose blood VI test strips (ONETOUCH ULTRA TEST) strip Use to test blood sugar daily 100 Strip 1    cyclobenzaprine (FLEXERIL) 5 mg tablet Take 1 Tab by mouth two (2) times daily as needed for Muscle Spasm(s). 15 Tab 0    fluticasone (FLONASE) 50 mcg/actuation nasal spray 2 Sprays by Both Nostrils route daily. 1 Bottle 2    albuterol (PROVENTIL HFA, VENTOLIN HFA, PROAIR HFA) 90 mcg/actuation inhaler Take 2 Puffs by inhalation every four (4) hours as needed for Wheezing. 1 Inhaler 3    Middle Ear Inflation Device (EAR POPPER INFLATION DEVICE) celia Use as directed for ear congestion 1 Device 0    lancets misc Check sugars once a day 100 Each 6    Insulin Needles, Disposable, 31 gauge x 5/16\" ndle Use to inject insulin once daily 1 Package 11    lidocaine (LIDODERM) 5 % Apply 1 patch as directed for 12 hours every 24 hours (12 hours on, 12 hours off)      albuterol (PROAIR HFA) 90 mcg/actuation inhaler PROAIR  (90 Base) MCG/ACT AERS      buPROPion SR (WELLBUTRIN SR) 150 mg SR tablet 150 mg.      esomeprazole (NEXIUM) 40 mg capsule 40 mg.      nystatin (MYCOSTATIN) 100,000 unit/mL suspension       inhalational spacing device Use every time you use your inhaler 1 Device 0    pseudoephedrine (SUDAFED) 60 mg tablet Take 1 Tab by mouth every six (6) hours as needed for Congestion. 40 Tab 0    Lancing Device (AUTO-LANCET MINI) misc Use to hold lancets 1 Each 0     No current facility-administered medications on file prior to visit. Allergies   Allergen Reactions    Ace Inhibitors Other (comments)     Other reaction(s): anaphylaxis/angioedema  See lisinopril  Comments.   Throat closure    Lisinopril Swelling     Other reaction(s): anaphylaxis/angioedema  Was  Hospitalized   At  [facility]   With angioedema  And  Had  To be  Intubated and  Provided with  [facility] vent support.  Penicillin V Shortness of Breath    Oxycodone-Acetaminophen Itching     Pt was taking this with lisinopril,     Social History     Tobacco Use    Smoking status: Current Every Day Smoker     Packs/day: 1.00    Smokeless tobacco: Never Used   Substance Use Topics    Alcohol use: Yes    Drug use: No     Physical Exam   Constitutional: She appears well-developed and well-nourished. No distress. /80 (BP 1 Location: Right arm, BP Patient Position: Sitting)   Pulse 71   Temp 96.2 °F (35.7 °C) (Oral)   Resp 18   Ht 5' 3\" (1.6 m)   Wt 207 lb (93.9 kg)   SpO2 92%   BMI 36.67 kg/m²      Eyes: EOM are normal. Right eye exhibits no discharge. Left eye exhibits no discharge. No scleral icterus. Neck: Neck supple. Cardiovascular: Normal rate, regular rhythm and normal heart sounds. Exam reveals no gallop and no friction rub. No murmur heard. Pulmonary/Chest: Effort normal and breath sounds normal. No respiratory distress. She has no wheezes. She has no rales. Musculoskeletal: She exhibits no edema or tenderness. Lymphadenopathy:     She has no cervical adenopathy. Neurological: She is alert. She exhibits normal muscle tone. Skin: Skin is warm and dry. Psychiatric: She has a normal mood and affect.      Lab Results   Component Value Date/Time    Sodium 138 02/18/2019 12:01 PM    Potassium 5.0 02/18/2019 12:01 PM    Chloride 104 02/18/2019 12:01 PM    CO2 22 02/18/2019 12:01 PM    Anion gap 12 02/18/2019 12:01 PM    Glucose 93 02/18/2019 12:01 PM    BUN 17 02/18/2019 12:01 PM    Creatinine 0.78 02/18/2019 12:01 PM    BUN/Creatinine ratio 22 (H) 02/18/2019 12:01 PM    GFR est AA >60 02/18/2019 12:01 PM    GFR est non-AA >60 02/18/2019 12:01 PM    Calcium 10.1 02/18/2019 12:01 PM    Bilirubin, total 0.5 02/18/2019 12:01 PM    AST (SGOT) 18 02/18/2019 12:01 PM    Alk. phosphatase 166 (H) 02/18/2019 12:01 PM    Protein, total 7.9 02/18/2019 12:01 PM    Albumin 4.2 02/18/2019 12:01 PM    Globulin 3.7 02/18/2019 12:01 PM    A-G Ratio 1.1 02/18/2019 12:01 PM    ALT (SGPT) 28 02/18/2019 12:01 PM     Lab Results   Component Value Date/Time    Hemoglobin A1c 7.1 (H) 02/18/2019 12:01 PM     Lab Results   Component Value Date/Time    WBC 8.4 11/27/2017 12:46 PM    HGB 16.2 (H) 11/27/2017 12:46 PM    HCT 48.9 (H) 11/27/2017 12:46 PM    PLATELET 258 (H) 96/52/6671 12:46 PM    MCV 87.2 11/27/2017 12:46 PM     ASSESSMENT and PLAN    ICD-10-CM ICD-9-CM    1. Essential hypertension I10 401.9    2. Mediastinal lymphadenopathy R59.0 785.6 CT CHEST W CONT   3. Centrilobular emphysema (HCC) J43.2 492.8 CT CHEST W CONT   4. Pain in both lower extremities M79.604 729.5 REFERRAL TO PHYSICAL THERAPY    M79.605     5.  Neck pain M54.2 723.1 REFERRAL TO PHYSICAL THERAPY     BP remains controlled  Continue current medication  CT chest ordered for one year f/u  Referral entered to PT

## 2019-06-19 NOTE — PROGRESS NOTES
Rm: 16    Chief Complaint   Patient presents with    Hypertension    Diabetes     Depression Screening:  3 most recent PHQ Screens 6/19/2019 6/25/2018 6/25/2018 3/1/2018 12/21/2017 11/27/2017   Little interest or pleasure in doing things Not at all Not at all Not at all Not at all Several days Not at all   Feeling down, depressed, irritable, or hopeless Not at all Not at all Not at all Not at all Several days Not at all   Total Score PHQ 2 0 0 0 0 2 0       Learning Assessment:  Learning Assessment 11/27/2017   PRIMARY LEARNER Patient   HIGHEST LEVEL OF EDUCATION - PRIMARY LEARNER  SOME COLLEGE   PRIMARY LANGUAGE ENGLISH   LEARNER PREFERENCE PRIMARY DEMONSTRATION   ANSWERED BY patient   RELATIONSHIP SELF       Abuse Screening:  Abuse Screening Questionnaire 6/25/2018   Do you ever feel afraid of your partner? N   Are you in a relationship with someone who physically or mentally threatens you? N   Is it safe for you to go home? Y       Health Maintenance reviewed and discussed per provider: no     Coordination of Care:    1. Have you been to the ER, urgent care clinic since your last visit? Hospitalized since your last visit? yes    2. Have you seen or consulted any other health care providers outside of the [address] [place] [address] since your last visit? Include any pap smears or colon screening.  yes

## 2019-06-25 ENCOUNTER — TELEPHONE (OUTPATIENT)
Dept: INTERNAL MEDICINE CLINIC | Age: 59
End: 2019-06-25

## 2019-06-25 NOTE — TELEPHONE ENCOUNTER
Patient stated she been coughing through out the day for the pass couple of days patient is out of town till 06/26 patient requesting Diane Mayers

## 2019-06-26 RX ORDER — AZITHROMYCIN 250 MG/1
TABLET, FILM COATED ORAL
Qty: 6 TAB | Refills: 0 | Status: SHIPPED | OUTPATIENT
Start: 2019-06-26 | End: 2019-07-01

## 2019-07-03 ENCOUNTER — APPOINTMENT (OUTPATIENT)
Dept: PHYSICAL THERAPY | Age: 59
End: 2019-07-03

## 2019-09-03 DIAGNOSIS — G45.9 TRANSIENT CEREBRAL ISCHEMIA, UNSPECIFIED TYPE: ICD-10-CM

## 2019-09-09 RX ORDER — CLOPIDOGREL BISULFATE 75 MG/1
TABLET ORAL
Qty: 90 TAB | Refills: 0 | Status: SHIPPED | OUTPATIENT
Start: 2019-09-09

## 2019-09-09 NOTE — TELEPHONE ENCOUNTER
Attempted to contact patient at  number, no answer. Lvm for patient to return call to office at [phone]. Will continue to try to contact patient. Patient needs to schedule an appointment with a new PCP to establish care in order for medication refills.

## 2019-09-09 NOTE — TELEPHONE ENCOUNTER
Sent electronically:    Requested Prescriptions     Signed Prescriptions Disp Refills    clopidogrel (PLAVIX) 75 mg tab 90 Tab 0     Sig: take 1 tablet by mouth once daily     Authorizing Provider: Kenna Romero

## 2019-09-09 NOTE — TELEPHONE ENCOUNTER
Patient is returning a call. States she does not have a new PCP. Was told she would see the doctor here who took over for Dr. Stephanie Serra. States she took her last pill this morning and the pharmacy already gave her a 5 day emergency supply. Patient states she can not just STOP taking this medication. ..... Not he fault all the providers are leaving and no one can see her. Tsering Finch it is wrong to withhold medications and she will be finding a new office.

## 2019-12-04 DIAGNOSIS — G45.9 TRANSIENT CEREBRAL ISCHEMIA, UNSPECIFIED TYPE: ICD-10-CM

## 2019-12-04 DIAGNOSIS — E78.5 HYPERLIPIDEMIA, UNSPECIFIED HYPERLIPIDEMIA TYPE: ICD-10-CM

## 2019-12-04 NOTE — TELEPHONE ENCOUNTER
Pharmacy fax request, last OV 6/19/19, no future appts scheduled. Unknown if if patient has a new PCP.     Requested Prescriptions     Pending Prescriptions Disp Refills    atorvastatin (LIPITOR) 40 mg tablet 90 Tab 1    clopidogrel (PLAVIX) 75 mg tab 90 Tab 0

## 2019-12-05 RX ORDER — ATORVASTATIN CALCIUM 40 MG/1
TABLET, FILM COATED ORAL
Qty: 90 TAB | Refills: 1 | OUTPATIENT
Start: 2019-12-05

## 2019-12-05 RX ORDER — CLOPIDOGREL BISULFATE 75 MG/1
TABLET ORAL
Qty: 90 TAB | Refills: 0 | OUTPATIENT
Start: 2019-12-05

## 2020-02-12 LAB — MAMMOGRAPHY, EXTERNAL: NORMAL

## 2020-05-20 PROBLEM — J20.9 ACUTE BRONCHITIS: Status: ACTIVE | Noted: 2020-01-23

## 2020-05-20 PROBLEM — J38.1 VOCAL CORD POLYP: Status: ACTIVE | Noted: 2018-06-26

## 2020-05-20 PROBLEM — Z72.0 TOBACCO ABUSE: Status: ACTIVE | Noted: 2017-09-23

## 2020-05-20 PROBLEM — Z02.89 ENCOUNTER FOR COMPLETION OF FORM WITH PATIENT: Status: ACTIVE | Noted: 2017-07-10

## 2020-05-20 PROBLEM — E66.9 ADIPOSITY: Status: ACTIVE | Noted: 2017-07-10

## 2020-05-20 PROBLEM — N61.0 MASTITIS, ACUTE: Status: ACTIVE | Noted: 2017-09-23

## 2020-05-20 PROBLEM — G45.9 TRANSIENT CEREBRAL ISCHEMIA: Status: ACTIVE | Noted: 2017-09-23

## 2020-05-20 PROBLEM — R13.12 OROPHARYNGEAL DYSPHAGIA: Status: ACTIVE | Noted: 2017-03-28

## 2020-05-20 PROBLEM — E04.2 MULTIPLE THYROID NODULES: Status: ACTIVE | Noted: 2017-06-19

## 2020-09-24 PROBLEM — J20.9 ACUTE BRONCHITIS: Status: RESOLVED | Noted: 2020-01-23 | Resolved: 2020-09-24

## 2020-09-24 PROBLEM — Z87.39 H/O RHEUMATOID ARTHRITIS: Status: ACTIVE | Noted: 2020-09-24

## 2020-09-24 PROBLEM — Z86.73 HISTORY OF TIA (TRANSIENT ISCHEMIC ATTACK): Status: ACTIVE | Noted: 2020-09-24

## 2020-09-24 PROBLEM — G45.9 TRANSIENT CEREBRAL ISCHEMIA: Status: RESOLVED | Noted: 2017-09-23 | Resolved: 2020-09-24

## 2020-09-24 PROBLEM — E04.2 MULTIPLE THYROID NODULES: Status: RESOLVED | Noted: 2017-06-19 | Resolved: 2020-09-24

## 2020-09-24 PROBLEM — N61.0 MASTITIS, ACUTE: Status: RESOLVED | Noted: 2017-09-23 | Resolved: 2020-09-24

## 2020-09-24 PROBLEM — Z02.89 ENCOUNTER FOR COMPLETION OF FORM WITH PATIENT: Status: RESOLVED | Noted: 2017-07-10 | Resolved: 2020-09-24

## 2020-09-24 PROBLEM — Z72.0 TOBACCO ABUSE: Status: RESOLVED | Noted: 2017-09-23 | Resolved: 2020-09-24

## 2021-05-19 NOTE — TELEPHONE ENCOUNTER
Per Dr Mekhi Tenorio: Massage envy, massage luxe or health and relaxation station on the first floor of [facility] no

## 2022-03-18 PROBLEM — Z79.4 TYPE 2 DIABETES MELLITUS WITHOUT COMPLICATION, WITH LONG-TERM CURRENT USE OF INSULIN (HCC): Status: ACTIVE | Noted: 2018-06-18

## 2022-03-18 PROBLEM — I10 ESSENTIAL HYPERTENSION: Status: ACTIVE | Noted: 2019-03-18

## 2022-03-18 PROBLEM — E11.9 TYPE 2 DIABETES MELLITUS WITHOUT COMPLICATION, WITH LONG-TERM CURRENT USE OF INSULIN (HCC): Status: ACTIVE | Noted: 2018-06-18

## 2022-03-19 PROBLEM — Z87.39 H/O RHEUMATOID ARTHRITIS: Status: ACTIVE | Noted: 2020-09-24

## 2022-03-19 PROBLEM — F17.200 TOBACCO DEPENDENCE: Status: ACTIVE | Noted: 2019-03-18

## 2022-03-19 PROBLEM — K04.7 TOOTH ABSCESS: Status: ACTIVE | Noted: 2018-07-06

## 2022-03-19 PROBLEM — Z86.73 HISTORY OF TIA (TRANSIENT ISCHEMIC ATTACK): Status: ACTIVE | Noted: 2020-09-24

## 2022-03-19 PROBLEM — J38.1 VOCAL CORD POLYP: Status: ACTIVE | Noted: 2018-06-26

## 2022-03-19 PROBLEM — R13.12 OROPHARYNGEAL DYSPHAGIA: Status: ACTIVE | Noted: 2017-03-28

## 2022-03-20 PROBLEM — K08.89 TOOTH PAIN: Status: ACTIVE | Noted: 2018-07-06

## 2022-03-20 PROBLEM — E66.9 ADIPOSITY: Status: ACTIVE | Noted: 2017-07-10

## 2022-06-14 ENCOUNTER — HOSPITAL ENCOUNTER (OUTPATIENT)
Dept: PHYSICAL THERAPY | Age: 62
Discharge: HOME | End: 2022-06-14
Payer: MEDICARE

## 2022-06-14 PROCEDURE — 97530 THERAPEUTIC ACTIVITIES: CPT

## 2022-06-14 PROCEDURE — 97162 PT EVAL MOD COMPLEX 30 MIN: CPT

## 2022-06-14 NOTE — PROGRESS NOTES
[facility]   [address] [facility] [facility]  Phone: [phone] Fax: [phone] / [address] [address] PHYSICAL THERAPY SERVICES  Patient Name: Marco A Prieto : 1960   Medical   Diagnosis: Muscle weakness (generalized) [M62.81]  Left leg pain [M79.605]  Right leg pain [M79.604] Treatment Diagnosis: LBP, B knee pain, generalized weakness   Onset Date: 2014     Referral Source: FELIZ Francois Start of Care [facility]): 2022   Prior Hospitalization: See medical history Provider #: [id number]   Prior Level of Function: Indep, enjoys walking for exercise, full-time college student   Comorbidities: HTN, OA, DM II, h/o stroke w/out deficits , asthma, 1 pack per day smoking history (started at 17 yo), h/o precancerous cells in the uterus(removed)   Medications: Verified on Patient Summary List   The Plan of Care and following information is based on the information from the initial evaluation.   ========================================================================  Assessment / key information:  Pt is a 64 yo female that presents to PT today w/ chief complaint of weight gain of 16# over the last 2 months, causing an increase in LBP. LBP ranges from 0-8/10. Currently 5/10. Pt also notes intermittent knee pain, ashley w/ inactivity. S&S are consistent at this time w/ generalized weakness/deconditioning following recent hospitalization for hemorrhaging (from internal hemorrhoid ) per pt. Pt has full knee, back, hip AROM without reported pain. However does have decreased weakness in BLE, proximal hips, periscapular musculature, decreased postural awareness w/ FHP, decreased endurance as noted by the 6MW test, and has decreased tolerance to functional activity as noted by the FOTO score of 59/100.  Pt would benefit from skilled PT to address current deficits in order to assist pt back to an active lifestyle to improve overall QOL and PLOF task tolerance. Further assessment is as follows:    Physical Therapy Evaluation - Lumbar Spine ([facility]     General Health:  Red Flags Indicated? []? Yes    [x]? No  []? Yes [x]? No   Recent weight change (If yes, increased snacking)     []? Yes [x]? No   Weakness in legs during walking  []? Yes [x]? No   Unremitting pain at night  []? Yes [x]? No   Abdominal pain or problems  []? Yes [x]? No   Rectal bleeding  []? Yes [x]? No   Feet more cold or painful in cold weather  []? Yes [x]? No   Blood or pain with urination  [x]? Yes []? No   Dysfunction of bowel or bladder ( recent hemmorage from ruptured hemmrrhoid)  []? Yes [x]? No   Recent illness within past 3 weeks (i.e, cold, flu)  []? Yes [x]? No   Numbness/tingling in buttock/genitalia region     Past History/Treatments:      Diagnostic Tests:      []? Lab work     []? X-rays    []? CT       []? MRI     []? Other:  Results: pt reports that she has had x-rays of the l/s and has OA, also notes recent colonoscopy 2/2 recent hospitalization, procedure was performed yesterday and pt awaiting results.      OBJECTIVE  Posture:  Lateral Shift:    [x]? R    [x]? L      []? +  [x]? -  Kyphosis:        [x]? Increased   []? Decreased   []? WNL  Lordosis:         [x]? Increased   []? Decreased   []? WNL  Pelvic symmetry: [x]? WNL       []? Other: FHP, rounded shoulders, L hand dominant     Gait:    Mild antalgia noted, no a.d., no LOB     Active Movements:              L/s: full and pain-free              Knees: full and pain-free              Shoulders: full and pain-free              T/s: decreased rotation noted bilaterally ~50%              Hips: WFL     Neuro Screen []?  WNL  Dermatomes: intact to light touch  Comments: pt denies any radicular S&S     Palpation: non-TTP l/s, knees        Strength:              BUE: grossly: 4+/5                       Middle trap: 3/5 bilaterally                      Lower trap: 3/5 bilaterally   BLE: grossly 5/5 w/ exception of hip abd: 3+/5 bilaterally and hip ext: 4-/5 BLE     Special Tests          Hip:            Radha Ishihara:              [x]? R    [x]? L    []? +    [x]? -                           Piriformis:        [x]? R    [x]? L   Moderate tissue restriction          Deficits:     Anna's: [x]? R    [x]? L    [x]? +    []? -                           Madhu:          [x]? R    [x]?  L    [x]? +    []? -                           Hamstrings 90/90: WNL                          Arminda: mod restriction L, min restriction R                                        Global Muscular Weakness:  Bridge: 50% full range, no pain     Other tests/comments:  FOTO: 59/100   Falls: pt denies  6MW: 1180 feet, SOB noted, no rest breaks required  30 sec STS: low mat table, hands on knees, x 11   ========================================================================  Eval Complexity: History: HIGH Complexity :3+ comorbidities / personal factors will impact the outcome/ POC Exam:MEDIUM Complexity : 3 Standardized tests and measures addressing body structure, function, activity limitation and / or participation in recreation  Presentation: MEDIUM Complexity : Evolving with changing characteristics  Clinical Decision Making:MEDIUM Complexity : FOTO score of 26-74Overall Complexity:MEDIUM  Problem List: pain affecting function, decrease ROM, decrease strength, impaired gait/ balance, decrease ADL/ functional abilitiies, decrease activity tolerance, decrease flexibility/ joint mobility and decrease transfer abilities   Treatment Plan may include any combination of the following: Therapeutic exercise, Therapeutic activities, Neuromuscular re-education, Physical agent/modality, Gait/balance training, Manual therapy, Patient education, Self Care training, Functional mobility training, Home safety training and Stair training  Patient / Family readiness to learn indicated by: asking questions, trying to perform skills and interest  Persons(s) to be included in education: patient (P)  Barriers to Learning/Limitations: None  Measures taken:    Patient Goal (s):  \"work on motion and weight loss\"   Patient self reported health status: good  Rehabilitation Potential: good   Short Term Goals: To be accomplished in  2  weeks:  1. Pt will be indep w/ HEP 5/7 days a week to improve ability to manage S&S. Status at last assessment initiated HEP   Long Term Goals: To be accomplished in  8  treatments:  1. Pt will have improved FOTO score to 64/100 in order to indicate improved tolerance to functional activity. Status at last assessment 59/100   2. Pt will demo improved proximal hip strength to grossly 4+/5 in order to better stabilize the l/s w/ functional movement and recreational walking to reduce pain. Status at last assessment hip abd: 3+/5 bilaterally and hip ext: 4-/5 BLE  3. Pt will have improved distance walked over 6 min to 1400 feet in order to indicate improved overall endurance in prep for walking program.   Status at last assessment 1180 feet, SOB noted, no rest breaks required  4. Pt will report a GROC score of 6 to indicate improved tolerance to functional activity. Status at last assessment initiated POC    Frequency / Duration:   Patient to be seen  2  times per week for 8  treatments:    Medicare Patients only : Patient would benefit from skilled PT 2 times per week for 24-36 sessions as needed in this certification period. Goals will be assigned and reassessed every 10 visits/ 30 days per Medicare guidelines   Patient / Caregiver education and instruction: self care, activity modification and exercises  Therapist Signature: Yamil Sharma PT Date: 0/16/2034   Certification Period: 6/14/22 to 9/14/22 Time: 3:18 PM   ========================================================================  I certify that the above Physical Therapy Services are being furnished while the patient is under my care.   I agree with the treatment plan and certify that this therapy is necessary. Physician Signature:        Date:       Time: ___                                            FELIZ Rick. Please sign and return to [facility] [place] or you may fax the signed copy to [phone]. Thank you.

## 2022-06-14 NOTE — PROGRESS NOTES
PT DAILY TREATMENT NOTE/LUMBAR EVAL     Patient Name: Ene Monique  Date:2022  : 1960  [x]  Patient  Verified  Payor: Anita Comfort / Plan: [address] / Product Type: Managed Care Medicare /    In time:1010  Out time: 1045  Total Treatment Time (min): 35  Visit #: 1 of 8    Medicare/BCBS Only   Total Timed Codes (min):  15 1:1 Treatment Time:  35     Treatment Area: Muscle weakness (generalized) [M62.81]  Left leg pain [M79.605]  Right leg pain [M79.604]  SUBJECTIVE  Current symptoms/Complaints: Pt is a 64 yo female that presents to PT w/ chief complaint of weight gain of 16# over the last 2 months, causing an increase in LBP. LBP ranges from 0-8/10. Currently 5/10. Pt reports that her pain increases w/ weight gain, washing dishes, standing >10-15 min, doing \"normal tasks\". Symptoms improve w/ exercises. Pt has done therapy before and notes that she reduced weight to 190#, currently 210 #. Goal weight: 20# off. Being monitored \"for 2 spots on spleen and will be seen in Aug 2022 for f/u\". Pt reports that she was having n/t in BLE n/t but has not had since she \"got out of the hospital May 2022 after hemorrhaging\". Denies any knee pain  Or back pain today but does report that w/ her being more sedentary over the last couple months, she has inc pain. Previous Treatment/Compliance:\"fat removal\" in .    PMHx/Surgical Hx: HTN, OA, DM II, h/o stroke w/out deficits , asthma, 1 pack per day smoking history (started at 17 yo), h/o precancerous cells in the uterus(removed)  Work Hx: currently student, just obtained BA in Social Work, accepted into grad school and will start in the fall  Living Situation: lives w/ son, 2-3 steps to enter  Pt Goals: \"work on motion and weight loss\"  Barriers: [x]pain []financial []time []transportation []other  Motivation: good  Substance use: []Alcohol [x]Tobacco (one pack per day) []other:     OBJECTIVE/EXAMINATION    20 min [x]Eval []Re-Eval     15 min Therapeutic Activity:  []  See flow sheet :   Rationale: HEp instruction, goals/intent of therapy, recommended dietician follow up  to improve the patients ability to manage weight and overall S&S. With   [] TE   [x] TA   [] neuro   [] other: Patient Education: [x] Review HEP, Pt educated on diagnosis and prognosis of their injury, current impairments, postural re-education, goals/role/intent of therapy and importance of compliance with HEP. Written HEP provided to patient and PT instructed pt on performance. Pt demo good understanding and recall. PT addressed pt questions/concerns regarding their condition. -recommended f/u w/ dietician, assisted to  for scheduling  Post tx, pt verbalized understanding and demo ability to complete HEP without any questions or concerns for PT. Other Objective/Functional Measures:     Physical Therapy Evaluation - Lumbar Spine ([facility]     General Health:  Red Flags Indicated? [] Yes    [x] No  [] Yes [x] No Recent weight change (If yes, increased snacking)   [] Yes [x] No Weakness in legs during walking  [] Yes [x] No Unremitting pain at night  [] Yes [x] No Abdominal pain or problems  [] Yes [x] No Rectal bleeding  [] Yes [x] No Feet more cold or painful in cold weather  [] Yes [x] No Blood or pain with urination  [x] Yes [] No Dysfunction of bowel or bladder ( recent hemmorage from ruptured hemmrrhoid)  [] Yes [x] No Recent illness within past 3 weeks (i.e, cold, flu)  [] Yes [x] No Numbness/tingling in buttock/genitalia region    Past History/Treatments:     Diagnostic Tests: [] Lab work [] X-rays    [] CT [] MRI     [] Other:  Results: pt reports that she has had x-rays of the l/s and has OA, also notes recent colonoscopy 2/2 recent hospitalization, procedure was performed yesterday and pt awaiting results.      OBJECTIVE  Posture:  Lateral Shift: [x] R    [x] L     [] +  [x] -  Kyphosis: [x] Increased [] Decreased []  WNL  Lordosis:  [x] Increased [] Decreased   [] WNL  Pelvic symmetry: [x] WNL    [] Other: FHP, rounded shoulders, L hand dominant    Gait: Mild antalgia noted, no a.d., no LOB    Active Movements:   L/s: full and pain-free   Knees: full and pain-free   Shoulders: full and pain-free   T/s: decreased rotation noted bilaterally ~50%   Hips: WFL    Neuro Screen [] WNL  Dermatomes: intact to light touch  Comments: pt denies any radicular S&S    Palpation: non-TTP l/s, knees      Strength:   BUE: grossly: 4+/5            Middle trap: 3/5 bilaterally           Lower trap: 3/5 bilaterally    Special Tests         Hip: Frederich Kaushal:  [x] R    [x] L    [] +    [x] -     Piriformis: [x] R    [x] L   Moderate tissue restriction         Deficits: Anna's: [x] R    [x] L    [x] +    [] -     Madhu: [x] R    [x] L    [x] +    [] -     Hamstrings 90/90: WNL    Arminda: mod restriction L, min restriction R       Global Muscular Weakness:  Bridge: 50% full range, no pain    Other tests/comments:  FOTO: 59/100   Falls: pt denies  6MW: 1180 feet, SOB noted, no rest breaks required  30 sec STS: low mat table, hands on knees, x 11     Pain Level (0-10 scale) post treatment:0/10    ASSESSMENT/Changes in Function: please refer to [address]    Patient will continue to benefit from skilled PT services to modify and progress therapeutic interventions, address functional mobility deficits, address ROM deficits, address strength deficits, analyze and address soft tissue restrictions, analyze and cue movement patterns, analyze and modify body mechanics/ergonomics, assess and modify postural abnormalities, address imbalance/dizziness and instruct in home and community integration to attain remaining goals.      [x]  See Plan of Care  []  See progress note/recertification  []  See Discharge Summary    Justification for Eval Code Complexity:  Patient History : see above  Examination see exam   Clinical Presentation: evolving  Clinical Decision Making : FOTO : 59 /100         Progress towards goals / Updated goals:  Please refer to POC    PLAN  [x]  Upgrade activities as tolerated     [x]  Continue plan of care  []  Update interventions per flow sheet       []  Discharge due to:_  [x]  Other: POC 2x/wk x 4 wks    Roly Pineda, PT 6/14/2022

## 2022-06-16 ENCOUNTER — APPOINTMENT (OUTPATIENT)
Dept: PHYSICAL THERAPY | Age: 62
End: 2022-06-16
Payer: MEDICARE

## 2022-06-20 ENCOUNTER — HOSPITAL ENCOUNTER (OUTPATIENT)
Dept: PHYSICAL THERAPY | Age: 62
End: 2022-06-20
Payer: MEDICARE

## 2022-06-22 ENCOUNTER — HOSPITAL ENCOUNTER (OUTPATIENT)
Dept: PHYSICAL THERAPY | Age: 62
End: 2022-06-22
Payer: MEDICARE

## 2022-06-23 ENCOUNTER — HOSPITAL ENCOUNTER (OUTPATIENT)
Dept: PHYSICAL THERAPY | Age: 62
Discharge: HOME | End: 2022-06-23
Payer: MEDICARE

## 2022-06-23 PROCEDURE — 97110 THERAPEUTIC EXERCISES: CPT

## 2022-06-23 PROCEDURE — 97530 THERAPEUTIC ACTIVITIES: CPT

## 2022-06-23 NOTE — PROGRESS NOTES
PT DAILY TREATMENT NOTE     Patient Name: Ene Monique  Date:2022  : 1960  [x]  Patient  Verified  Payor: Edgard McintyreCummings Tu / Plan: [facility] COMMUNITY PLAN MCR / Product Type: Managed Care Medicare /    In time:1401 Out time:1450  Total Treatment Time (min): 49  Visit #: 2 of 8    Medicare/BCBS Only   Total Timed Codes (min):  49 1:1 Treatment Time:  49       Treatment Area: Muscle weakness (generalized) [M62.81]  Left leg pain [M79.605]  Right leg pain [M79.604]    SUBJECTIVE  Pain Level (0-10 scale): 0/10  Any medication changes, allergies to medications, adverse drug reactions, diagnosis change, or new procedure performed?: [x] No    [] Yes (see summary sheet for update)  Subjective functional status/changes:   [] No changes reported  Pt reports fair compliance w/ HEP. Denies any pain today, notes that she did not set up appt w/ dietician. OBJECTIVE    30 min Therapeutic Exercise:  [] See flow sheet :   Rationale: increase ROM and increase strength to improve the patients ability to perform stairs, walking, wellness activities w/ less pain. 19 min Therapeutic Activity:  []  See flow sheet :   Rationale: improve coordination and improve balance  to improve the patients ability to perform functional activity w/ improved postural awareness and mechanics to reduce pain in the l/s and the knees. throughout tx Patient Education: [x] Review HEP , discussed intent of each activity, potential for DOMs w/ activity progression, provided pt w/ another copy of HEP per pt request 2/2 pt reports of losing her prior one. Discussed w/ pt and front office pt desire to lose weight and request for dietician.       Other Objective/Functional Measures:   First visit back since the IE  TM: 2x2 min at 0% incline, speed of 1.6 mph  BP: seated, electric cuff, brachial artery 132/88 mmHg    Pain Level (0-10 scale) post treatment: 0/10    ASSESSMENT/Changes in Function:   Pt noted to have gross decreased endurance and strength w/ fatigue noted during TM ambulation and LE strengthening. Pt requires intermittent rest breaks during activity to accommodate for fatigue. Pt unable to ambulate >2 min at a time on the TM, required breaking of time up into increments, completing 2 intervals of 2 min. Utilized inhaler at 1435 during session for \"improved breathing\". 100% v.c. needed throughout session for postural corrections and slow/controlled movement patterns w/ activity. Fair>good recall w/ activity. Pt BP checked at end of session and was noted to be WNL. Patient will continue to benefit from skilled PT services to modify and progress therapeutic interventions, address functional mobility deficits, address ROM deficits, address strength deficits, analyze and address soft tissue restrictions, analyze and cue movement patterns, analyze and modify body mechanics/ergonomics, assess and modify postural abnormalities, address imbalance/dizziness and instruct in home and community integration to attain remaining goals. [x]  See Plan of Care  []  See progress note/recertification  []  See Discharge Summary         PN due 7/14/22  Progress towards goals / Updated goals: · Short Term Goals: To be accomplished in  2  weeks:  1. Pt will be indep w/ HEP 5/7 days a week to improve ability to manage S&S. Status at last assessment initiated HEP  Current: 6/23/22: notes fair compliance w/ HEP  · Long Term Goals: To be accomplished in  8  treatments:  1. Pt will have improved FOTO score to 64/100 in order to indicate improved tolerance to functional activity. Status at last assessment 59/100   2. Pt will demo improved proximal hip strength to grossly 4+/5 in order to better stabilize the l/s w/ functional movement and recreational walking to reduce pain. Status at last assessment hip abd: 3+/5 bilaterally and hip ext: 4-/5 BLE  Current: 6/23/22: addressing w/ step ups, clams, bridge activity  3.  Pt will have improved distance walked over 6 min to 1400 feet in order to indicate improved overall endurance in prep for walking program.   Status at last assessment 1180 feet, SOB noted, no rest breaks required  Current: 6/23/22: initiated TM walking, 2x 2 min interval  4. Pt will report a GROC score of 6 to indicate improved tolerance to functional activity.    Status at last assessment initiated POC    PLAN  [x]  Upgrade activities as tolerated     [x]  Continue plan of care  []  Update interventions per flow sheet       []  Discharge due to:_  [x]  Other: assess tolerance to initial visit    [facility] 6/23/2022      Future Appointments   Date Time Provider Luis Ge   6/24/2022  7:15 AM Sheba Aland [facility]   6/28/2022  7:45 AM Bettyann Handy, PTA [facility]   6/30/2022  7:45 AM Bettyann Handy, PTA [facility]   7/5/2022  7:00 AM Bettyann Handy, PTA [facility]   7/7/2022  7:00 AM Bettyann Handy, PTA [facility]   7/12/2022  8:30 AM [facility] PT GH[place] 2 [facility]   7/19/2022  7:45 AM Bettyann Handy, PTA [facility]   7/21/2022  7:45 AM Sheba Aland [facility]   7/26/2022  7:45 AM Bettyann Handy, PTA [facility]   7/28/2022  7:45 AM Sheba Aland [facility]

## 2022-06-24 ENCOUNTER — APPOINTMENT (OUTPATIENT)
Dept: PHYSICAL THERAPY | Age: 62
End: 2022-06-24
Payer: MEDICARE

## 2022-06-28 ENCOUNTER — APPOINTMENT (OUTPATIENT)
Dept: PHYSICAL THERAPY | Age: 62
End: 2022-06-28
Payer: MEDICARE

## 2022-06-29 ENCOUNTER — HOSPITAL ENCOUNTER (OUTPATIENT)
Dept: PHYSICAL THERAPY | Age: 62
Discharge: HOME | End: 2022-06-29
Payer: MEDICARE

## 2022-06-29 PROCEDURE — 97530 THERAPEUTIC ACTIVITIES: CPT

## 2022-06-29 PROCEDURE — 97110 THERAPEUTIC EXERCISES: CPT

## 2022-06-29 NOTE — PROGRESS NOTES
PT DAILY TREATMENT NOTE     Patient Name: Manfred Howell  Date:2022  : 1960  [x]  Patient  Verified  Payor: [facility] MEDICARE / Plan: [facility] COMMUNITY PLAN MCR / Product Type: Managed Care Medicare /    In time:833 Out time:924  Total Treatment Time (min): 51  Visit #:  3 of 8    Medicare/BCBS Only   Total Timed Codes (min):  51  1:1 Treatment Time:  40       Treatment Area: Muscle weakness (generalized) [M62.81]  Left leg pain [M79.605]  Right leg pain [M79.604]    SUBJECTIVE  Pain Level (0-10 scale): 0/10  Any medication changes, allergies to medications, adverse drug reactions, diagnosis change, or new procedure performed?: [x] No    [] Yes (see summary sheet for update)  Subjective functional status/changes:   [] No changes reported  Pt reports  \"my [birth year] is this weekend\"    OBJECTIVE    40 min Therapeutic Exercise:  [x] See flow sheet :   Rationale: increase ROM and increase strength to improve the patients ability to perform stairs, walking, wellness activities w/ less pain. 11 min Therapeutic Activity:  [x]  See flow sheet :  Stepping   Squatting/ pushing (leg press)   Rationale: improve coordination and improve balance  to improve the patients ability to perform functional activity w/ improved postural awareness and mechanics to reduce pain in the l/s and the knees. throughout tx Patient Education: [x] Review HEP, smoking cessation      Other Objective/Functional Measures:   Functional progression - initiated leg press for squatting activities     Pain Level (0-10 scale) post treatment: 0/10    ASSESSMENT/Changes in Function:   Pt continues to have min pain levels. Cardio limitations noted with step ups sec to hx of tobacco use. Discussed smoking cessation. Tactile cueing for prone letters to avoid compensatory patterns. VCing to avoid hip rolling with clams. Full TS mobility with open books today.   Patient would benefit from continued focus on functional core and LE strength. Patient currently \"day of\" / day to day scheduling sec to poor compliance and patient is aware. Patient scheduled for nutrition apt in Sept .     Patient will continue to benefit from skilled PT services to modify and progress therapeutic interventions, address functional mobility deficits, address ROM deficits, address strength deficits, analyze and address soft tissue restrictions, analyze and cue movement patterns, analyze and modify body mechanics/ergonomics, assess and modify postural abnormalities, address imbalance/dizziness and instruct in home and community integration to attain remaining goals. [x]  See Plan of Care  []  See progress note/recertification  []  See Discharge Summary         PN due 7/14/22  Progress towards goals / Updated goals: · Short Term Goals: To be accomplished in  2  weeks:  1. Pt will be indep w/ HEP 5/7 days a week to improve ability to manage S&S. Status at last assessment initiated HEP  Current: 6/23/22: notes fair compliance w/ HEP    · Long Term Goals: To be accomplished in  8  treatments:  1. Pt will have improved FOTO score to 64/100 in order to indicate improved tolerance to functional activity. Status at last assessment 59/100     2. Pt will demo improved proximal hip strength to grossly 4+/5 in order to better stabilize the l/s w/ functional movement and recreational walking to reduce pain. Status at last assessment hip abd: 3+/5 bilaterally and hip ext: 4-/5 BLE  Current: 6/23/22: addressing w/ step ups, clams, bridge activity    3. Pt will have improved distance walked over 6 min to 1400 feet in order to indicate improved overall endurance in prep for walking program.   Status at last assessment 1180 feet, SOB noted, no rest breaks required  Current: 6/23/22: initiated TM walking, 2x 2 min interval, performed step ups for 30 sec continuously for CV strength to improve walking endurance 6/29    4.  Pt will report a GROC score of 6 to indicate improved tolerance to functional activity.    Status at last assessment initiated POC    PLAN  [x]  Upgrade activities as tolerated     [x]  Continue plan of care  []  Update interventions per flow sheet       []  Discharge due to:_  [x]  Other: day of treatments     Sandoval Campuzano, DRAKE 6/29/2022      Future Appointments   Date Time Provider Luis Ge   6/29/2022  8:30 AM Norah Page., PT [facility]

## 2022-06-30 ENCOUNTER — APPOINTMENT (OUTPATIENT)
Dept: PHYSICAL THERAPY | Age: 62
End: 2022-06-30
Payer: MEDICARE

## 2022-07-05 ENCOUNTER — APPOINTMENT (OUTPATIENT)
Dept: PHYSICAL THERAPY | Age: 62
End: 2022-07-05

## 2022-07-07 ENCOUNTER — APPOINTMENT (OUTPATIENT)
Dept: PHYSICAL THERAPY | Age: 62
End: 2022-07-07

## 2022-07-12 ENCOUNTER — APPOINTMENT (OUTPATIENT)
Dept: PHYSICAL THERAPY | Age: 62
End: 2022-07-12

## 2022-07-19 ENCOUNTER — APPOINTMENT (OUTPATIENT)
Dept: PHYSICAL THERAPY | Age: 62
End: 2022-07-19

## 2022-07-21 ENCOUNTER — APPOINTMENT (OUTPATIENT)
Dept: PHYSICAL THERAPY | Age: 62
End: 2022-07-21

## 2022-07-26 ENCOUNTER — APPOINTMENT (OUTPATIENT)
Dept: PHYSICAL THERAPY | Age: 62
End: 2022-07-26

## 2022-07-28 ENCOUNTER — APPOINTMENT (OUTPATIENT)
Dept: PHYSICAL THERAPY | Age: 62
End: 2022-07-28

## 2022-08-09 NOTE — PROGRESS NOTES
[facility] AT [address] [address], [place], [place]  Phone: [phone] Fax [phone]  DISCHARGE SUMMARY  Patient Name: Chidi Montez : 1960   Treatment/Medical Diagnosis: Muscle weakness (generalized) [M62.81]  Left leg pain [M79.605]  Right leg pain [M79.604]   Referral Source: FELIZ Lanier     Date of Initial Visit: 22 Attended Visits: 3 Missed Visits: 3     SUMMARY OF TREATMENT  Patient was seen for 3 visits and missed 3 visits and therefore will be DC sec to non return. Unable to formally assess goals as patient did not return after . RECOMMENDATIONS  Discontinue therapy due to lack of attendance or compliance. If you have any questions/comments please contact us directly at [phone]. Thank you for allowing us to assist in the care of your patient.   Therapist Signature: Trudy Castillo PT Date: 2022   Reporting Period: 2022-2022 Time: 12:53 PM

## 2023-03-28 ENCOUNTER — HOSPITAL ENCOUNTER (OUTPATIENT)
Facility: HOSPITAL | Age: 63
Setting detail: RECURRING SERIES
Discharge: HOME | End: 2023-03-31
Payer: MEDICARE

## 2023-03-28 PROCEDURE — 97162 PT EVAL MOD COMPLEX 30 MIN: CPT

## 2023-03-28 NOTE — PROGRESS NOTES
PT DAILY TREATMENT NOTE/NEURO EVAL     Patient Name: Priyanka Arredondo    Date: 3/28/2023    : 1960  Insurance: Payor: [facility] MEDICARE / Plan: Katelynn Dueñas COMPLETE / Product Type: *No Product type* /      Patient  verified yes     Visit #   Current / Total 1 10   Time   In / Out 150 222   Pain   In / Out 7 7   Subjective Functional Status/Changes: See POC    Least Pain: 2/10  Worst Pain: 10/10    Aggravating Factors: doing school work, walking, car transfers, reaching overhead    Relieving Factors: Rest   Changes to:  Meds, Allergies, Med Hx, Sx Hx? If yes, update Summary List no       Treatment Area: Low back pain [M54.50]  Pain in left shoulder [M25.512]  Pain in left ankle and joints of left foot [M25.572]    SEE POC      32 min [x]Eval     - untimed        [x]  Patient Education billed concurrently with other procedures     Pain Level (0-10 scale) post treatment: 7    ASSESSMENT/Changes in Function: see POC    Patient will continue to benefit from skilled PT services to modify and progress therapeutic interventions, analyze and address functional mobility deficits, analyze and address ROM deficits, analyze and address strength deficits, analyze and address soft tissue restrictions, analyze and cue for proper movement patterns, analyze and modify for postural abnormalities, analyze and address imbalance/dizziness, and instruct in home and community integration to address functional deficits and attain remaining goals. [x]  See Plan of Care - for goals and assessment     PLAN  []  Upgrade activities as tolerated     []  Continue plan of care  []  Update interventions per flow sheet       []  Other:_      Sarahy Marina, PT 3/28/2023  1:49 PM  Justification for Eval Code Complexity:  Patient History : high  Examination see exam mod  Clinical Presentation:  Mod  Clinical Decision Making : FOTO :
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
## 2023-03-30 ENCOUNTER — HOSPITAL ENCOUNTER (OUTPATIENT)
Facility: HOSPITAL | Age: 63
Setting detail: RECURRING SERIES
End: 2023-03-30
Payer: MEDICARE

## 2023-03-30 PROCEDURE — 97535 SELF CARE MNGMENT TRAINING: CPT

## 2023-03-30 PROCEDURE — 97110 THERAPEUTIC EXERCISES: CPT

## 2023-03-30 PROCEDURE — 97112 NEUROMUSCULAR REEDUCATION: CPT

## 2023-03-30 PROCEDURE — 97530 THERAPEUTIC ACTIVITIES: CPT

## 2023-03-30 NOTE — PROGRESS NOTES
PHYSICAL THERAPY - DAILY TREATMENT NOTE (updated )    Patient Name: Karl Fairly    Date: 3/30/2023    : 1960  Insurance: Payor: Perry Herrmann / Plan: Librado Oreilly / Product Type: *No Product type* /      Patient  verified yes     Visit #   Current / Total 2    Time   In / Out 200 255   Pain   In / Out 7/10 5/10   Subjective Functional Status/Changes: \"I am feeling it today. I have to sit and study a lot so everything gets tight. I will do as much as I can but I know I need to be stronger\"   Changes to:  Meds, Allergies, Med Hx, Sx Hx? If yes, update Summary List no       TREATMENT AREA =  Low back pain [M54.50]  Pain in left shoulder [M25.512]  Pain in left ankle and joints of left foot [M25.572]    OBJECTIVE    Modalities Rationale:     decrease inflammation and decrease pain to improve patient's ability to progress to PLOF and address remaining functional goals. 10 min  unbill [x]  Ice L SH     [x]  Heat  L/s   location/position: Supine with wedge under B LEs     [x]Skin assessment post-treatment (if applicable):   [x]  intact    []  redness- no adverse reaction                 []redness - adverse reaction:        Therapeutic Procedures: Tx Min Billable or 1:1 Min (if diff from Tx Min) Procedure, Rationale, Specifics   15  19678 Therapeutic Exercise (timed):  increase ROM, strength, coordination, balance, and proprioception to improve patient's ability to progress to PLOF and address remaining functional goals. (see flow sheet as applicable)     Details if applicable:       10  28322 Neuromuscular Re-Education (timed):  improve balance, coordination, kinesthetic sense, posture, core stability and proprioception to improve patient's ability to develop conscious control of individual muscles and awareness of position of extremities in order to progress to PLOF and address remaining functional goals.  (see flow sheet as applicable)     Details if applicable:     10  65553 restrictions, analyze and cue for proper movement patterns, analyze and modify for postural abnormalities, and instruct in home and community integration to address functional deficits and attain remaining goals.   modify and progress therapeutic interventions, analyze and address functional mobility deficits, analyze and address ROM deficits, analyze and address strength deficits, analyze and address soft tissue restrictions, analyze and cue for proper movement patterns, analyze and modify for postural abnormalities, and instruct in home and community integration  Progress toward goals / Updated goals:  []  See Progress Note/Recertification    Pt's first visit since [facility], no noted progress       PLAN  yes Continue plan of care  [x]  Upgrade activities as tolerated  []  Discharge due to :  []  Other:    Lashaun Odonnell PTA    3/30/2023    2:54 PM    Future Appointments   Date Time Provider Ronda Moses   4/4/2023  2:00 PM Lashaun Odonnell PTA [facility]   4/6/2023  1:30 PM Chloe Hernandez PT [facility]

## 2023-04-04 ENCOUNTER — HOSPITAL ENCOUNTER (OUTPATIENT)
Facility: HOSPITAL | Age: 63
Setting detail: RECURRING SERIES
Discharge: HOME | End: 2023-04-07
Payer: MEDICARE

## 2023-04-04 PROCEDURE — 97110 THERAPEUTIC EXERCISES: CPT

## 2023-04-04 PROCEDURE — 97112 NEUROMUSCULAR REEDUCATION: CPT

## 2023-04-04 PROCEDURE — 97530 THERAPEUTIC ACTIVITIES: CPT

## 2023-04-04 NOTE — PROGRESS NOTES
deficits and attain remaining goals. modify and progress therapeutic interventions, analyze and address functional mobility deficits, analyze and address ROM deficits, analyze and address strength deficits, analyze and address soft tissue restrictions, analyze and cue for proper movement patterns, analyze and modify for postural abnormalities, and instruct in home and community integration  Progress toward goals / Updated goals:  []  See Progress Note/Recertification  Short Term Goals: To be accomplished in 4 weeks   Pt will be compliant with HEP for symptom management at home. Established HEP  2.    Pt will demonstrate left shoulder flexion AROM of at least 120 degrees to improve overhead reaching. addressed with pulleys  3. Pt will demonstrate left dorsiflexion AROM of 7 degrees from neutral to improve gait training. 4. Pt will demonstrate right cervical side bending of at least 30 degrees to maximize rehab outcomes. Long Term Goals: To be accomplished in 8-12 weeks   Pt will be independent with HEP at D/C for self management. 2.  Pt will demonstrate left shoulder flexion strength of at least 4/5 to improve overhead lifting. 3.  Pt will report 50% improvement in condition to improve quality of life. 4.  Pt will increase FOTO Functional Status score to 50/100 to decrease functional limitations.       PLAN  yes Continue plan of care  [x]  Upgrade activities as tolerated  []  Discharge due to :  []  Other:    [place] PTA    4/4/2023    2:57 PM    Future Appointments   Date Time Provider Ronda Moses   4/6/2023  1:30 PM Wesly Garcia PT [facility]   4/13/2023  2:30 PM Wesly Garcia PT [facility]   4/14/2023  1:00 PM [facility]

## 2023-04-13 ENCOUNTER — HOSPITAL ENCOUNTER (OUTPATIENT)
Facility: HOSPITAL | Age: 63
Setting detail: RECURRING SERIES
Discharge: HOME | End: 2023-04-16
Payer: MEDICARE

## 2023-04-13 PROCEDURE — 97530 THERAPEUTIC ACTIVITIES: CPT

## 2023-04-13 PROCEDURE — 97112 NEUROMUSCULAR REEDUCATION: CPT

## 2023-04-13 PROCEDURE — G0283 ELEC STIM OTHER THAN WOUND: HCPCS

## 2023-04-13 PROCEDURE — 97110 THERAPEUTIC EXERCISES: CPT

## 2023-05-01 ENCOUNTER — HOSPITAL ENCOUNTER (OUTPATIENT)
Facility: HOSPITAL | Age: 63
Setting detail: RECURRING SERIES
Discharge: HOME | End: 2023-05-04
Payer: MEDICARE

## 2023-05-01 PROCEDURE — G0283 ELEC STIM OTHER THAN WOUND: HCPCS

## 2023-05-01 PROCEDURE — 97110 THERAPEUTIC EXERCISES: CPT

## 2023-05-01 PROCEDURE — 97530 THERAPEUTIC ACTIVITIES: CPT

## 2023-05-01 PROCEDURE — 97112 NEUROMUSCULAR REEDUCATION: CPT

## 2023-05-01 NOTE — PROGRESS NOTES
[facility] AT [facility]  [address] [address]   Phone: [phone] Fax: [phone]  PROGRESS NOTE  Patient Name: Cathi Singh : 1960   Treatment/Medical Diagnosis:  Low back pain [M54.50]  Pain in left shoulder [M25.512]  Pain in left ankle and joints of left foot [M25.572]   Referral Source: Thad Kelley MD     Date of Initial Visit: 3/28/23 Attended Visits: 6 Missed Visits: 1     SUMMARY OF TREATMENT  Patient's treatment has consisted of shoulder ROM and stabilization exercises,  postural education, LE and core strengthening exercises, and instruction in home exercise program.    CURRENT STATUS     Pt will be compliant with HEP for symptom management at home. MET  2. Pt will demonstrate left shoulder flexion AROM of at least 120 degrees to improve overhead reaching. Progressing =110 degrees   3. Pt will demonstrate left dorsiflexion AROM of 7 degrees from neutral to improve gait training. MET  4. Pt will demonstrate right cervical side bending of at least 30 degrees to maximize rehab outcomes. NOT MET     Pt has attended initial evaluation and 5 treatment session to address pain. Pt was not seen between 23-23 secondary to awaiting signed plan of care per insurance guidelines. Pt presents to PT reporting continued left shoulder pain with movement and decreased tolerance to positions for sleeping, but demo's increased AROM flexion. Pt reports left foot pain improved 80%, noting pain with prolonged standing and walking activity. Pt reports LBP intermittent and she has to be aware of how she moves to avoid increased pain. Cervical AROM   Right Sidebending  25   Left Sidebending 38      Left shoulder flexion AROM: 110 degrees with pain top of shoulder   Left ankle DF AROM: 8 degrees       New goals:    Pt will be independent with HEP at D/C for self management.   2.  Pt will demonstrate left shoulder flexion

## 2023-05-01 NOTE — PROGRESS NOTES
PHYSICAL / OCCUPATIONAL THERAPY - DAILY TREATMENT NOTE (updated )    Patient Name: Marcin Evans    Date: 2023    : 1960  Insurance: Payor: Matty Mayo / Plan: Sonya Jain COMPLETE / Product Type: *No Product type* /      Patient  verified yes     Visit #   Current / Total 6 10   Time   In / Out 4:00 4:59   Pain   In / Out 6 0   Subjective Functional Status/Changes: Pt reports went to her PCP for a regular visit, but they did discuss her left shoulder and talked about sending her to an orthopedic doctor. Pt states she is able to lift her arm better, but still has pain with moving her arm certain ways and finding comfortable position to sleep in. Pt reports her left foot pain is about 80% improved, bothers her if she stands or walks for too long. Pt states her lower back pain comes and goes and she has to be aware of the way she moves. Changes to:  Meds, Allergies, Med Hx, Sx Hx? If yes, update Summary List no       TREATMENT AREA =  Low back pain [M54.50]  Pain in left shoulder [M25.512]  Pain in left ankle and joints of left foot [M25.572]    OBJECTIVE  Modalities Rationale:     decrease pain to improve patient's ability to progress to PLOF and address remaining functional goals.                  15 min [] Estim Unattended, type/location: IFC to left shoulder, supine with wedge under B LE                                                []  w/ice    [x]  w/heat     min [] Estim Attended, type/location:                                                []  w/US     []  w/ice    []  w/heat    []  TENS insruct       min []  Mechanical Traction: type/lbs                    []  pro   []  sup   []  int   []  cont    []  before manual    []  after manual     min []  Ultrasound, settings/location:        min []  Iontophoresis w/ dexamethasone, location:                                                []  take home patch       []  in clinic     min  unbill []  Ice     []  Heat

## 2023-05-03 ENCOUNTER — HOSPITAL ENCOUNTER (OUTPATIENT)
Facility: HOSPITAL | Age: 63
Setting detail: RECURRING SERIES
Discharge: HOME | End: 2023-05-06
Payer: MEDICARE

## 2023-05-03 PROCEDURE — 97530 THERAPEUTIC ACTIVITIES: CPT

## 2023-05-03 PROCEDURE — 97110 THERAPEUTIC EXERCISES: CPT

## 2023-05-03 PROCEDURE — G0283 ELEC STIM OTHER THAN WOUND: HCPCS

## 2023-05-03 PROCEDURE — 97112 NEUROMUSCULAR REEDUCATION: CPT

## 2023-05-03 NOTE — PROGRESS NOTES
PHYSICAL / OCCUPATIONAL THERAPY - DAILY TREATMENT NOTE (updated )    Patient Name: Isac Neal    Date: 5/3/2023    : 1960  Insurance: Payor: [facility] MEDICARE / Plan: Joetta Severance COMPLETE / Product Type: *No Product type* /      Patient  verified yes     Visit #   Current / Total 7 16-24   Time   In / Out 12:45 1:38   Pain   In / Out 6 6   Subjective Functional Status/Changes: Pt reports 6/10 shoulder pain. Changes to:  Meds, Allergies, Med Hx, Sx Hx? If yes, update Summary List no       TREATMENT AREA = Low back pain [M54.50]  Pain in left shoulder [M25.512]  Pain in left ankle and joints of left foot [M25.572]    OBJECTIVE    Modalities Rationale:     decrease pain to improve patient's ability to progress to PLOF and address remaining functional goals. 15 min [x] Estim Unattended, type/location: IFC to left shoulder, supine with wedge under B LE                                     []  w/ice    []  w/heat    min [] Estim Attended, type/location:                                     []  w/US     []  w/ice    []  w/heat    []  TENS insruct      min []  Mechanical Traction: type/lbs                   []  pro   []  sup   []  int   []  cont    []  before manual    []  after manual    min []  Ultrasound, settings/location:      min []  Iontophoresis w/ dexamethasone, location:                                               []  take home patch       []  in clinic    min  unbill []  Ice     []  Heat    location/position:     min []  Paraffin,  details:     min []  Vasopneumatic Device, press/temp:     min []  David Hargrove / [place] Ready: If using vaso (only need to measure limb vaso being performed on)      pre-treatment girth :       post-treatment girth :       measured at (landmark location) :      min []  Other:    Skin assessment post-treatment (if applicable):    [x]  intact    []  redness- no adverse reaction                 []redness - adverse reaction:        Therapeutic Procedures:   Tx Min

## 2023-05-08 ENCOUNTER — HOSPITAL ENCOUNTER (OUTPATIENT)
Facility: HOSPITAL | Age: 63
Setting detail: RECURRING SERIES
Discharge: HOME | End: 2023-05-11
Payer: MEDICARE

## 2023-05-08 PROCEDURE — 97110 THERAPEUTIC EXERCISES: CPT

## 2023-05-08 PROCEDURE — 97112 NEUROMUSCULAR REEDUCATION: CPT

## 2023-05-10 ENCOUNTER — HOSPITAL ENCOUNTER (OUTPATIENT)
Facility: HOSPITAL | Age: 63
Setting detail: RECURRING SERIES
Discharge: HOME | End: 2023-05-13
Payer: MEDICARE

## 2023-05-10 PROCEDURE — 97110 THERAPEUTIC EXERCISES: CPT

## 2023-05-10 PROCEDURE — 97112 NEUROMUSCULAR REEDUCATION: CPT

## 2023-05-10 PROCEDURE — 97530 THERAPEUTIC ACTIVITIES: CPT

## 2023-05-10 NOTE — PROGRESS NOTES
PHYSICAL / OCCUPATIONAL THERAPY - DAILY TREATMENT NOTE (updated )    Patient Name: Fatou Daugherty    Date: 5/10/2023    : 1960  Insurance: Payor: Yonis Page / Plan: Oneita Saliva / Product Type: *No Product type* /      Patient  verified yes     Visit #   Current / Total 9 16-24   Time   In / Out 2:38 3:16   Pain   In / Out 3 left shd  4-5 left UT   Subjective Functional Status/Changes: Pt reports she is having a little pain, but \"it's not too bad\". Pt states she needs earlier appointment times if available. Changes to:  Meds, Allergies, Med Hx, Sx Hx? If yes, update Summary List no       TREATMENT AREA =  Low back pain [M54.50]  Pain in left shoulder [M25.512]  Pain in left ankle and joints of left foot [M25.572]    OBJECTIVE    Therapeutic Procedures: Tx Min Billable or 1:1 Min (if diff from Tx Min) Procedure, Rationale, Specifics   15  72906 Therapeutic Exercise (timed):  increase ROM, strength, coordination, balance, and proprioception to improve patient's ability to progress to PLOF and address remaining functional goals. (see flow sheet as applicable)     Details if applicable:       10  82476 Therapeutic Activity (timed):  use of dynamic activities replicating functional movements to increase ROM, strength, coordination, balance, and proprioception in order to improve patient's ability to progress to PLOF and address remaining functional goals. (see flow sheet as applicable)     Details if applicable:     13  55349 Neuromuscular Re-Education (timed):  improve balance, coordination, kinesthetic sense, posture, core stability and proprioception to improve patient's ability to develop conscious control of individual muscles and awareness of position of extremities in order to progress to PLOF and address remaining functional goals.  (see flow sheet as applicable)     Details if applicable:     45  [facility] BC Totals Reminder: bill using total billable min of TIMED therapeutic

## 2023-05-15 ENCOUNTER — HOSPITAL ENCOUNTER (OUTPATIENT)
Facility: HOSPITAL | Age: 63
Setting detail: RECURRING SERIES
Discharge: HOME | End: 2023-05-18
Payer: MEDICARE

## 2023-05-15 PROCEDURE — 97530 THERAPEUTIC ACTIVITIES: CPT

## 2023-05-15 PROCEDURE — 97110 THERAPEUTIC EXERCISES: CPT

## 2023-05-15 PROCEDURE — 97112 NEUROMUSCULAR REEDUCATION: CPT

## 2023-05-15 NOTE — PROGRESS NOTES
PHYSICAL / OCCUPATIONAL THERAPY - DAILY TREATMENT NOTE (updated )    Patient Name: Luma Fuelling    Date: 5/15/2023    : 1960  Insurance: Payor: Singh Rogers / Plan: [employer] / Product Type: *No Product type* /      Patient  verified YES    Visit #   Current / Total 10 16-24   Time   In / Out 1143 1222   Pain   In / Out 0 0   Subjective Functional Status/Changes: Pt arrives to session 27 minutes late. She relies on transportation. DPT informed her to call ASAP if she thinks she may be late to her appointment so accommodations can be made. Denies any pain today. Changes to:  Meds, Allergies, Med Hx, Sx Hx? If yes, update Summary List no       TREATMENT AREA =  Low back pain [M54.50]  Pain in left shoulder [M25.512]  Pain in left ankle and joints of left foot [M25.572]    OBJECTIVE        Therapeutic Procedures: Tx Min Billable or 1:1 Min (if diff from Tx Min) Procedure, Rationale, Specifics   19  09227 Therapeutic Exercise (timed):  increase ROM, strength, coordination, balance, and proprioception to improve patient's ability to progress to PLOF and address remaining functional goals. (see flow sheet as applicable)     Details if applicable:    See flowsheet     10  15372 Therapeutic Activity (timed):  use of dynamic activities replicating functional movements to increase ROM, strength, coordination, balance, and proprioception in order to improve patient's ability to progress to PLOF and address remaining functional goals. (see flow sheet as applicable)     Details if applicable:    See flow sheet     10  15010 Neuromuscular Re-Education (timed):  improve balance, coordination, kinesthetic sense, posture, core stability and proprioception to improve patient's ability to develop conscious control of individual muscles and awareness of position of extremities in order to progress to PLOF and address remaining functional goals.  (see flow sheet as applicable)     Details if

## 2023-05-17 ENCOUNTER — APPOINTMENT (OUTPATIENT)
Facility: HOSPITAL | Age: 63
End: 2023-05-17
Payer: MEDICARE

## 2023-05-17 ENCOUNTER — HOSPITAL ENCOUNTER (OUTPATIENT)
Facility: HOSPITAL | Age: 63
Setting detail: RECURRING SERIES
Discharge: HOME | End: 2023-05-20
Payer: MEDICARE

## 2023-05-17 PROCEDURE — 97530 THERAPEUTIC ACTIVITIES: CPT

## 2023-05-17 PROCEDURE — 97110 THERAPEUTIC EXERCISES: CPT

## 2023-05-17 NOTE — PROGRESS NOTES
PHYSICAL / OCCUPATIONAL THERAPY - DAILY TREATMENT NOTE (updated )    Patient Name: Markus Public    Date: 2023    : 1960  Insurance: Payor: [facility] MEDICARE / Plan: Markus Beverly / Product Type: *No Product type* /      Patient  verified YES    Visit #   Current / Total 11 16-24   Time   In / Out 122 149   Pain   In / Out 0-1/10 0-1/10   Subjective Functional Status/Changes: Pt  request to leave in 30 mins due to transportation needs to leave   \"I am not hurting really too bad. I want to get my SH imaged bc its still not right\"  \"I do not want to do too much with my Sh bc I have to clean after this\"   Changes to:  Meds, Allergies, Med Hx, Sx Hx? If yes, update Summary List no       TREATMENT AREA =  Low back pain [M54.50]  Pain in left shoulder [M25.512]  Pain in left ankle and joints of left foot [M25.572]    OBJECTIVE        Therapeutic Procedures: Tx Min Billable or 1:1 Min (if diff from Tx Min) Procedure, Rationale, Specifics   10  04098 Therapeutic Exercise (timed):  increase ROM, strength, coordination, balance, and proprioception to improve patient's ability to progress to PLOF and address remaining functional goals. (see flow sheet as applicable)     Details if applicable:    See flowsheet     17  27422 Therapeutic Activity (timed):  use of dynamic activities replicating functional movements to increase ROM, strength, coordination, balance, and proprioception in order to improve patient's ability to progress to PLOF and address remaining functional goals.   (see flow sheet as applicable)     Details if applicable:    See flow sheet    (I) with SS x 30'  (I) for HK/butt kick amb x 60'       32  [facility] Totals Reminder: bill using total billable min of TIMED therapeutic procedures (example: do not include dry needle or estim unattended, both untimed codes, in totals to left)  8-22 min = 1 unit; 23-37 min = 2 units; 38-52 min = 3 units; 53-67 min = 4 units; 68-82 min = 5 units

## 2023-05-23 ENCOUNTER — HOSPITAL ENCOUNTER (OUTPATIENT)
Facility: HOSPITAL | Age: 63
Setting detail: RECURRING SERIES
Discharge: HOME | End: 2023-05-26
Payer: MEDICARE

## 2023-05-23 PROCEDURE — G0283 ELEC STIM OTHER THAN WOUND: HCPCS

## 2023-05-23 PROCEDURE — 97110 THERAPEUTIC EXERCISES: CPT

## 2023-05-23 PROCEDURE — 97530 THERAPEUTIC ACTIVITIES: CPT

## 2023-05-23 PROCEDURE — 97112 NEUROMUSCULAR REEDUCATION: CPT

## 2023-05-23 NOTE — PROGRESS NOTES
activities as tolerated  []  Discharge: See DC Note  []  Other:    Ivanna Dryer \"BJ\" Fátima, DPT, Cert. MDT, Cert. DN, Cert. SMT, Dip.  Osteopractic    5/23/2023    1:23 PM    Future Appointments   Date Time Provider Ronda Moses   5/25/2023 12:40 PM Lori Shah, PT [facility]   5/31/2023 10:30 AM Bill Camacho MD [facility] AM[facility]   5/31/2023  2:00 PM Elsie Piedra PTA [facility]   6/1/2023  1:20 PM Elsie Piedra PTA [facility]   6/2/2023  8:40 AM Lori Shah, PT [facility]

## 2023-05-25 ENCOUNTER — APPOINTMENT (OUTPATIENT)
Facility: HOSPITAL | Age: 63
End: 2023-05-25
Payer: MEDICARE

## 2023-05-31 ENCOUNTER — APPOINTMENT (OUTPATIENT)
Facility: HOSPITAL | Age: 63
End: 2023-05-31
Payer: MEDICARE

## 2023-05-31 ENCOUNTER — HOSPITAL ENCOUNTER (OUTPATIENT)
Facility: HOSPITAL | Age: 63
Setting detail: RECURRING SERIES
Discharge: HOME | End: 2023-06-03
Payer: MEDICARE

## 2023-05-31 ENCOUNTER — OFFICE VISIT (OUTPATIENT)
Age: 63
End: 2023-05-31
Payer: MEDICARE

## 2023-05-31 VITALS — WEIGHT: 207 LBS | BODY MASS INDEX: 36.68 KG/M2 | HEIGHT: 63 IN

## 2023-05-31 DIAGNOSIS — S46.012A TRAUMATIC TEAR OF LEFT ROTATOR CUFF, UNSPECIFIED TEAR EXTENT, INITIAL ENCOUNTER: ICD-10-CM

## 2023-05-31 DIAGNOSIS — M25.512 CHRONIC LEFT SHOULDER PAIN: Primary | ICD-10-CM

## 2023-05-31 DIAGNOSIS — G89.29 CHRONIC LEFT SHOULDER PAIN: Primary | ICD-10-CM

## 2023-05-31 PROCEDURE — G0283 ELEC STIM OTHER THAN WOUND: HCPCS

## 2023-05-31 PROCEDURE — G8417 CALC BMI ABV UP PARAM F/U: HCPCS | Performed by: ORTHOPAEDIC SURGERY

## 2023-05-31 PROCEDURE — 3017F COLORECTAL CA SCREEN DOC REV: CPT | Performed by: ORTHOPAEDIC SURGERY

## 2023-05-31 PROCEDURE — 97110 THERAPEUTIC EXERCISES: CPT

## 2023-05-31 PROCEDURE — G8427 DOCREV CUR MEDS BY ELIG CLIN: HCPCS | Performed by: ORTHOPAEDIC SURGERY

## 2023-05-31 PROCEDURE — 4004F PT TOBACCO SCREEN RCVD TLK: CPT | Performed by: ORTHOPAEDIC SURGERY

## 2023-05-31 PROCEDURE — 99204 OFFICE O/P NEW MOD 45 MIN: CPT | Performed by: ORTHOPAEDIC SURGERY

## 2023-05-31 PROCEDURE — 97112 NEUROMUSCULAR REEDUCATION: CPT

## 2023-05-31 PROCEDURE — 97530 THERAPEUTIC ACTIVITIES: CPT

## 2023-05-31 RX ORDER — DULAGLUTIDE 0.75 MG/.5ML
INJECTION, SOLUTION SUBCUTANEOUS
COMMUNITY
Start: 2023-05-12

## 2023-05-31 RX ORDER — ATORVASTATIN CALCIUM 20 MG/1
TABLET, FILM COATED ORAL
COMMUNITY
Start: 2023-05-17 | End: 2023-05-31

## 2023-05-31 NOTE — PROGRESS NOTES
PHYSICAL / OCCUPATIONAL THERAPY - DAILY TREATMENT NOTE (updated )    Patient Name: Peyman Dean    Date: 2023    : 1960  Insurance: Payor: [facility] MEDICARE / Plan: Desiree Connor / Product Type: *No Product type* /      Patient  verified YES    Visit #   Current / Total 13 16-24   Time   In / Out 200 249   Pain   In / Out 6/10 L SH  4/10 L/s  0/10 L foot 4/10  3/10 L/s  0/10 L foot   Subjective Functional Status/Changes:  \"I saw the ortho for my SH, I am going to get an MRI. We didn't talk about my back\"   Changes to:  Meds, Allergies, Med Hx, Sx Hx? If yes, update Summary List no       TREATMENT AREA =  Low back pain [M54.50]  Pain in left shoulder [M25.512]  Pain in left ankle and joints of left foot [M25.572]    OBJECTIVE  Modalities Rationale:     decrease pain to improve patient's ability to  progress to PLOF and address remaining functional goals. 10 min [x] Estim, type/location: Biphasic to left SH in sitting                                     []  att     [x]  unatt     []  w/US     []  w/ice    [x]  w/heat    min []  Mechanical Traction: type/lbs                   []  pro   []  sup   []  int   []  cont    []  before manual    []  after manual    min []  Ultrasound, settings/location:      min []  Iontophoresis w/ dexamethasone, location:                                               []  take home patch       []  in clinic   10 min [x]  Ice     []  Heat    location/position: Seatd to L/s   [x]Skin assessment post-treatment (if applicable):   [x]  intact    []  redness- no adverse reaction                  []redness - adverse reaction:    e reaction:            Therapeutic Procedures:   Tx Min Billable or 1:1 Min (if diff from Tx Min) Procedure, Rationale, Specifics   10    [id number] Neuromuscular Re-Education (timed):  improve balance, coordination, kinesthetic sense, posture, core stability and proprioception to improve patient's ability to develop conscious control of

## 2023-05-31 NOTE — PROGRESS NOTES
Examination     R   L  ROM   FF  Full   Full  ER  Full   Full   IR  Full   Full  Rotator Cuff Pain   Supra  -   +   Infra  -   +   Subscap -   -  Crepitus  -   -  Effusion  -   -  Warmth  -   -   Erythema  -   -  Instability  -   -  AC Joint TTP  -   -  Clavicle   Deformity -   -   TTP  -   -  Proximal Humerus   Deformity -   -   TTP  -   -  Deltoid Strength 5   5  Biceps Strength 5   5  Biceps Deformity -   -  Biceps Groove Pain -   -  Impingement Sign -   -       IMAGING:  Imaging read by myself and interpreted as follows:  X-rays from the date of injury were reviewed 3 views of the left shoulder which are normal.  No acute bony abnormalities. ASSESSMENT & PLAN  Diagnosis: Left shoulder rotator cuff tear    Mau Montiel has left shoulder pain, a traumatic injury, and concern on examination for rotator cuff tearing. She has failed conservative management with physical therapy as well as 2-1/2 months of treatment since the accident. Therefore due to her unresolved pain, examination, and concern for traumatic rotator cuff injury I have recommended an MRI of the left shoulder which was ordered today. I will see her back after the MRI is completed. Prescription medication management discussed. Plan was discussed in detail with patient, all questions were answered, and patient voiced understanding of plan. Electronically signed by: Reece Kendrick MD    Note: This note was completed using voice recognition software.   Any typographical/name errors or mistakes are unintentional.

## 2023-06-01 ENCOUNTER — HOSPITAL ENCOUNTER (OUTPATIENT)
Facility: HOSPITAL | Age: 63
Setting detail: RECURRING SERIES
Discharge: HOME | End: 2023-06-04
Payer: MEDICARE

## 2023-06-01 PROCEDURE — 97110 THERAPEUTIC EXERCISES: CPT

## 2023-06-01 PROCEDURE — 97530 THERAPEUTIC ACTIVITIES: CPT

## 2023-06-02 ENCOUNTER — APPOINTMENT (OUTPATIENT)
Facility: HOSPITAL | Age: 63
End: 2023-06-02
Payer: MEDICARE

## 2023-06-06 ENCOUNTER — HOSPITAL ENCOUNTER (OUTPATIENT)
Facility: HOSPITAL | Age: 63
Setting detail: RECURRING SERIES
Discharge: HOME | End: 2023-06-09
Payer: MEDICARE

## 2023-06-06 PROCEDURE — 97530 THERAPEUTIC ACTIVITIES: CPT

## 2023-06-06 PROCEDURE — 97112 NEUROMUSCULAR REEDUCATION: CPT

## 2023-06-06 PROCEDURE — 97110 THERAPEUTIC EXERCISES: CPT

## 2023-06-06 NOTE — PROGRESS NOTES
PHYSICAL / OCCUPATIONAL THERAPY - DAILY TREATMENT NOTE (updated )    Patient Name: Shannen Brenner    Date: 2023    : 1960  Insurance: Payor: [facility] MEDICARE / Plan: Rosalin Bence / Product Type: *No Product type* /      Patient  verified yes     Visit #   Current / Total 15 19-23   Time   In / Out 12:44 1:22   Pain   In / Out 3 4-5   Subjective Functional Status/Changes: Pt reports 3/10 pain. Changes to:  Meds, Allergies, Med Hx, Sx Hx? If yes, update Summary List no       TREATMENT AREA =    Low back pain [M54.50]  Pain in left shoulder [M25.512]  Pain in left ankle and joints of left foot [M25.572]    OBJECTIVE    Therapeutic Procedures: Tx Min Billable or 1:1 Min (if diff from Tx Min) Procedure, Rationale, Specifics   20  84755 Therapeutic Exercise (timed):  increase ROM, strength, coordination, balance, and proprioception to improve patient's ability to progress to PLOF and address remaining functional goals. (see flow sheet as applicable)     Details if applicable:  UBE, ex per flow sheet     10  48227 Neuromuscular Re-Education (timed):  improve balance, coordination, kinesthetic sense, posture, core stability and proprioception to improve patient's ability to develop conscious control of individual muscles and awareness of position of extremities in order to progress to PLOF and address remaining functional goals. (see flow sheet as applicable)     Details if applicable:     8  47585 Therapeutic Activity (timed):  use of dynamic activities replicating functional movements to increase ROM, strength, coordination, balance, and proprioception in order to improve patient's ability to progress to PLOF and address remaining functional goals.   (see flow sheet as applicable)     Details if applicable:               45  [facility] Totals Reminder: bill using total billable min of TIMED therapeutic procedures (example: do not include dry needle or estim unattended, both untimed codes, in

## 2023-06-08 ENCOUNTER — APPOINTMENT (OUTPATIENT)
Facility: HOSPITAL | Age: 63
End: 2023-06-08
Payer: MEDICARE

## 2023-06-15 ENCOUNTER — APPOINTMENT (OUTPATIENT)
Facility: HOSPITAL | Age: 63
End: 2023-06-15
Payer: MEDICARE

## 2023-06-20 ENCOUNTER — HOSPITAL ENCOUNTER (OUTPATIENT)
Facility: HOSPITAL | Age: 63
Setting detail: RECURRING SERIES
Discharge: HOME | End: 2023-06-23
Payer: MEDICARE

## 2023-06-20 PROCEDURE — G0283 ELEC STIM OTHER THAN WOUND: HCPCS

## 2023-06-20 PROCEDURE — 97110 THERAPEUTIC EXERCISES: CPT

## 2023-06-20 PROCEDURE — 97530 THERAPEUTIC ACTIVITIES: CPT

## 2023-06-20 NOTE — PROGRESS NOTES
PHYSICAL / OCCUPATIONAL THERAPY - DAILY TREATMENT NOTE (updated )    Patient Name: Salomón Maurer    Date: 2023    : 1960  Insurance: Payor: Shirin Agustin / Plan: Jono Gaviria / Product Type: *No Product type* /      Patient  verified yes     Visit #   Current / Total -   Time   In / Out 8:44 9:32   Pain   In / Out 5/10 \"achy\" 0/10   Subjective Functional Status/Changes: \"I am achy all over. I was shampooing my room floor so I am feeling it today\"     TREATMENT AREA =  Low back pain [M54.50]  Pain in left shoulder [M25.512]  Pain in left ankle and joints of left foot [M25.572]    OBJECTIVE    Modalities Rationale:     decrease pain and increase tissue extensibility to improve patient's ability to progress to PLOF and address remaining functional goals. 12 min [x] Estim Unattended, type/location: Biphasic to L/s, Patient supine with LE wedge                                    []  w/ice    [x]  w/heat    min [] Estim Attended, type/location:                                     []  w/US     []  w/ice    []  w/heat    []  TENS insruct      min []  Mechanical Traction: type/lbs                   []  pro   []  sup   []  int   []  cont    []  before manual    []  after manual    min []  Ultrasound, settings/location:      min []  Iontophoresis w/ dexamethasone, location:                                               []  take home patch       []  in clinic   12 min  unbill []  Ice     [x]  Heat    location/position: MHP to left SH only during e-stem    min []  Paraffin,  details:     min []  Vasopneumatic Device, press/temp:     min []  Catrachito Marion / Lethaniel Patience:     If using vaso (only need to measure limb vaso being performed on)      pre-treatment girth :       post-treatment girth :       measured at (landmark location) :      min []  Other:    Skin assessment post-treatment (if applicable):    [x]  intact    []  redness- no adverse reaction                 []redness - adverse

## 2023-06-22 ENCOUNTER — HOSPITAL ENCOUNTER (OUTPATIENT)
Facility: HOSPITAL | Age: 63
Setting detail: RECURRING SERIES
Discharge: HOME | End: 2023-06-25
Payer: MEDICARE

## 2023-06-22 ENCOUNTER — TELEPHONE (OUTPATIENT)
Age: 63
End: 2023-06-22

## 2023-06-22 PROCEDURE — 97112 NEUROMUSCULAR REEDUCATION: CPT

## 2023-06-22 PROCEDURE — 97530 THERAPEUTIC ACTIVITIES: CPT

## 2023-06-22 PROCEDURE — 97110 THERAPEUTIC EXERCISES: CPT

## 2023-06-22 NOTE — TELEPHONE ENCOUNTER
[facility] is requesting patient MRI order to be faxed over         Fax  [phone]      Phone   [phone]

## 2023-06-27 ENCOUNTER — APPOINTMENT (OUTPATIENT)
Facility: HOSPITAL | Age: 63
End: 2023-06-27
Payer: MEDICARE

## 2023-06-30 ENCOUNTER — HOSPITAL ENCOUNTER (OUTPATIENT)
Facility: HOSPITAL | Age: 63
Setting detail: RECURRING SERIES
End: 2023-06-30
Payer: MEDICARE

## 2023-07-05 ENCOUNTER — APPOINTMENT (OUTPATIENT)
Facility: HOSPITAL | Age: 63
End: 2023-07-05
Payer: MEDICARE

## 2023-07-07 ENCOUNTER — APPOINTMENT (OUTPATIENT)
Facility: HOSPITAL | Age: 63
End: 2023-07-07
Payer: MEDICARE

## 2023-07-13 ENCOUNTER — TELEPHONE (OUTPATIENT)
Age: 63
End: 2023-07-13

## 2023-07-13 NOTE — TELEPHONE ENCOUNTER
Patient called and said that she had the mri of the Left shoulder done on 7/8/23 at [facility] in [place]. Due to Dr. Dev Delatorre next appt at [facility] Other is on 8/16/23, patient is asking if she could get a call back with the mri results. Patient said her Physical Therapy is on hold. That she would like to know what the next step will be. Patient tel. [phone].

## 2023-07-14 NOTE — TELEPHONE ENCOUNTER
Called and spoke to Mrs Traci Mendez and made her aware of Dr Jamila Nunes response.  She verbalized understanding

## 2023-07-18 ENCOUNTER — HOSPITAL ENCOUNTER (OUTPATIENT)
Facility: HOSPITAL | Age: 63
Setting detail: RECURRING SERIES
Discharge: HOME | End: 2023-07-21
Payer: MEDICARE

## 2023-07-18 PROCEDURE — 97110 THERAPEUTIC EXERCISES: CPT

## 2023-07-18 PROCEDURE — 97530 THERAPEUTIC ACTIVITIES: CPT

## 2023-07-18 PROCEDURE — G0283 ELEC STIM OTHER THAN WOUND: HCPCS

## 2023-07-18 NOTE — PROGRESS NOTES
PHYSICAL / OCCUPATIONAL THERAPY - DAILY TREATMENT NOTE (updated )    Patient Name: Asmita Connor    Date: 2023    : 1960  Insurance: Payor: Patricia Manual / Plan: Chris Kuhn / Product Type: *No Product type* /      Patient  verified yes     Visit #   Current / Total 20 22+   Time   In / Out 1:20 2:10   Pain   In / Out 3 0   Subjective Functional Status/Changes: Pt reports she got an MRI on her shoulder and they told her the rotator cuff is okay, but was told there could be a tear in her labrum. (See results of MRI from chart review in objective). Pt states still having LBP and she has been gaining weight in her abdomen which isn't helping so her doctor is putting or the Ozempic for her diabetes. Pt states her foot seems to be doing okay, still swells up every once in a while. Pt reports 6/10 worst pain for her lower back and 4/10 worst pain for her left shoulder. TREATMENT AREA =  Low back pain [M54.50]  Pain in left shoulder [M25.512]  Pain in left ankle and joints of left foot [M25.572]    OBJECTIVE  10 min [x] Estim Unattended, type/location: Biphasic to L/s, Patient supine with LE wedge with MHP                                    []  w/ice    [x]  w/heat       Therapeutic Procedures: Tx Min Billable or 1:1 Min (if diff from Tx Min) Procedure, Rationale, Specifics   25  41280 Therapeutic Exercise (timed):  increase ROM, strength, coordination, balance, and proprioception to improve patient's ability to progress to PLOF and address remaining functional goals. (see flow sheet as applicable)     Details if applicable:       15  28910 Therapeutic Activity (timed):  use of dynamic activities replicating functional movements to increase ROM, strength, coordination, balance, and proprioception in order to improve patient's ability to progress to PLOF and address remaining functional goals.   (see flow sheet as applicable)     Details if applicable:     [address]
of your patient. Certification Period: 7/18/23-8/17/23  Reporting Period (date from last assessment to current assessment): 6/1/23-7/18/23    PTA signature  Ludy Ruiz PTA     Therapist Signature: ROXANA Mullen Date: 7/18/23     Time: 3:08 PM         ___ I have read the above report and request that my patient continue as recommended.   ___ I have read the above report and request that my patient continue therapy with the following changes/special instructions: ________________________________________________   ___ I have read the above report and request that my patient be discharged from therapy. Physician's Signature:_________________________   DATE:_________   TIME:________                           Belle Posey MD    ** Signature, Date and Time must be completed for valid certification **  Please sign and return to [facility] or you may fax the signed copy to [phone]  Thank you.

## 2023-07-25 ENCOUNTER — HOSPITAL ENCOUNTER (OUTPATIENT)
Facility: HOSPITAL | Age: 63
Setting detail: RECURRING SERIES
Discharge: HOME | End: 2023-07-28
Payer: MEDICARE

## 2023-07-25 PROCEDURE — G0283 ELEC STIM OTHER THAN WOUND: HCPCS

## 2023-07-25 PROCEDURE — 97110 THERAPEUTIC EXERCISES: CPT

## 2023-07-25 PROCEDURE — 97112 NEUROMUSCULAR REEDUCATION: CPT

## 2023-07-25 PROCEDURE — 97530 THERAPEUTIC ACTIVITIES: CPT

## 2023-07-25 NOTE — PROGRESS NOTES
PHYSICAL / OCCUPATIONAL THERAPY - DAILY TREATMENT NOTE (updated )    Patient Name: Bekah Taylor    Date: 2023    : 1960  Insurance: Payor: [facility] MEDICARE / Plan: Ivana Legacy / Product Type: *No Product type* /      Patient  verified yes     Visit #   Current / Total 21 28+   Time   In / Out 12:04 1:03   Pain   In / Out \"Stiff\" 0/10   Subjective Functional Status/Changes: \"I can be compliant right now before school starts. I know  I am doing better with my exercise\"         TREATMENT AREA =  Low back pain [M54.50]  Pain in left shoulder [M25.512]  Pain in left ankle and joints of left foot [M25.572]    OBJECTIVE  10 min [x] Estim Unattended, type/location: Biphasic to L/s, Patient supine with LE wedge with MHP  MHP to [facility]                                    []  w/ice    [x]  w/heat       Therapeutic Procedures: Tx Min Billable or 1:1 Min (if diff from Tx Min) Procedure, Rationale, Specifics   12  [id number] Neuromuscular Re-Education (timed):  improve balance, coordination, kinesthetic sense, posture, core stability and proprioception to improve patient's ability to develop conscious control of individual muscles and awareness of position of extremities in order to progress to PLOF and address remaining functional goals. (see flow sheet as applicable)      22  19512 Therapeutic Exercise (timed):  increase ROM, strength, coordination, balance, and proprioception to improve patient's ability to progress to PLOF and address remaining functional goals. (see flow sheet as applicable)     Details if applicable:    (I) for HK amb x 60'  x 2 with 5#   (I) with suitcase carry x 60' ea UE with 5#      15  02765 Therapeutic Activity (timed):  use of dynamic activities replicating functional movements to increase ROM, strength, coordination, balance, and proprioception in order to improve patient's ability to progress to PLOF and address remaining functional goals.   (see flow sheet as

## 2023-07-27 ENCOUNTER — HOSPITAL ENCOUNTER (OUTPATIENT)
Facility: HOSPITAL | Age: 63
Setting detail: RECURRING SERIES
Discharge: HOME | End: 2023-07-30
Payer: MEDICARE

## 2023-07-27 PROCEDURE — 97530 THERAPEUTIC ACTIVITIES: CPT

## 2023-07-27 PROCEDURE — 97110 THERAPEUTIC EXERCISES: CPT

## 2023-07-27 PROCEDURE — 97112 NEUROMUSCULAR REEDUCATION: CPT

## 2023-07-27 NOTE — PROGRESS NOTES
using total billable min of TIMED therapeutic procedures (example: do not include dry needle or estim unattended, both untimed codes, in totals to left)  8-22 min = 1 unit; 23-37 min = 2 units; 38-52 min = 3 units; 53-67 min = 4 units; 68-82 min = 5 units   Total Total     [x]  Patient Education billed concurrently with other procedures   [x] Review HEP    [] Progressed/Changed HEP, detail:    [] Other detail:       Objective Information/Functional Measures/Assessment    Pt c/o left shoulder pain and stiffness with reaching left UE out and OH. Added shoulder isometrics flex, ext, IR and ER for stabilization with good tolerance. Mod Vcing for form. Mod Vcing for form with wall push up. P.D. e-stim with MHP due to TC, pt's ride called and advised they were on the way. Pt unsteady during high knee and retro gait, but no LOB noted. Patient will continue to benefit from skilled PT services to modify and progress therapeutic interventions, analyze and address functional mobility deficits, analyze and address ROM deficits, analyze and address strength deficits, analyze and address soft tissue restrictions, analyze and cue for proper movement patterns, and instruct in home and community integration to address functional deficits and attain remaining goals. Progress toward goals / Updated goals:  []  See Progress Note/Recertification     Patient will demonstrate left shoulder flexion strength of at least 4/5 to improve overhead lifting. Added isometrics  2. Patient will demonstrate greater than or equal to 130 degrees left shoulder abduction AROM to facilitate lateral reach   3. Patient will demo greater than or equal to 140 degrees left shoulder flexion AROM to facilitate reaching OH cabinets pt c/o increased left shoulder pain with shoulder elevation >90 degrees  4. Patient will report able to walk for 15 minutes with less than or equal to 4/10 LBP to indicate increased activity tolerance.      PLAN  yes

## 2023-08-01 ENCOUNTER — HOSPITAL ENCOUNTER (OUTPATIENT)
Facility: HOSPITAL | Age: 63
Setting detail: RECURRING SERIES
Discharge: HOME | End: 2023-08-04
Payer: MEDICARE

## 2023-08-01 PROCEDURE — 97112 NEUROMUSCULAR REEDUCATION: CPT

## 2023-08-01 PROCEDURE — 97110 THERAPEUTIC EXERCISES: CPT

## 2023-08-01 PROCEDURE — 97530 THERAPEUTIC ACTIVITIES: CPT

## 2023-08-01 NOTE — PROGRESS NOTES
PHYSICAL / OCCUPATIONAL THERAPY - DAILY TREATMENT NOTE (updated )    Patient Name: Ivory Scott    Date: 2023    : 1960  Insurance: Payor: Augusta Burnett / Plan: Lorie Salomon / Product Type: *No Product type* /      Patient  verified Yes     Visit #   Current / Total 23 28   Time   In / Out 11:22 12:00   Pain   In / Out 6 left foot 5 left shoulder, 6 leftfoot   Subjective Functional Status/Changes: Pt reports 6/10 left foot pain, states that she thinks that her gout is acting up because of something she ate. Pt declines lower body exercise this session due to left foot pain. Pt declined 2nd set t-band shoulder IR/ER due to shoulder discomfort. Next PN/ RC due PN/RC 2023  Auth due juma العلي, 2023    TREATMENT AREA =  Low back pain [M54.50]  Pain in left shoulder [M25.512]  Pain in left ankle and joints of left foot [M25.572]    OBJECTIVE    Therapeutic Procedures: Tx Min Billable or 1:1 Min (if diff from Tx Min) Procedure, Rationale, Specifics   15  74156 Therapeutic Exercise (timed):  increase ROM, strength, coordination, balance, and proprioception to improve patient's ability to progress to PLOF and address remaining functional goals. (see flow sheet as applicable)     Details if applicable:       8  75360 Neuromuscular Re-Education (timed):  improve balance, coordination, kinesthetic sense, posture, core stability and proprioception to improve patient's ability to develop conscious control of individual muscles and awareness of position of extremities in order to progress to PLOF and address remaining functional goals. (see flow sheet as applicable)     Details if applicable:     15  25958 Therapeutic Activity (timed):  use of dynamic activities replicating functional movements to increase ROM, strength, coordination, balance, and proprioception in order to improve patient's ability to progress to PLOF and address remaining functional goals.   (see flow

## 2023-08-04 ENCOUNTER — HOSPITAL ENCOUNTER (OUTPATIENT)
Facility: HOSPITAL | Age: 63
Setting detail: RECURRING SERIES
Discharge: HOME | End: 2023-08-07
Payer: MEDICARE

## 2023-08-04 PROCEDURE — 97110 THERAPEUTIC EXERCISES: CPT

## 2023-08-04 PROCEDURE — 97530 THERAPEUTIC ACTIVITIES: CPT

## 2023-08-04 NOTE — PROGRESS NOTES
PHYSICAL  DAILY TREATMENT NOTE     Patient Name: Danny Ramachandran    Date: 2023    : 1960  Insurance: Payor: [facility] MEDICARE / Plan: Boris Dubose COMPLETE / Product Type: *No Product type* /      Patient  verified Yes     Visit #   Current / Total 24 28   Time   In / Out 1128 1200   Pain   In / Out 0 1.5/10   Subjective Functional Status/Changes: Pt reports no p! today, states that she feels good today. Pt requested to not do any exercises in the bed due to upcoming move at home. Pt reports that L shoulder limits her when she has to lift heavy things; \"like totes\"  Pt requested to keep session short today due to shampooing the carpets over the weekend     TREATMENT AREA =  Low back pain [M54.50]  Pain in left shoulder [M25.512]  Pain in left ankle and joints of left foot [M25.572]    OBJECTIVE      Therapeutic Procedures: Tx Min Billable or 1:1 Min (if diff from Tx Min) Procedure, Rationale, Specifics   12  09081 Therapeutic Exercise (timed):  increase ROM, strength, coordination, balance, and proprioception to improve patient's ability to progress to PLOF and address remaining functional goals. (see flow sheet as applicable)     Details if applicable:       20  25399 Therapeutic Activity (timed):  use of dynamic activities replicating functional movements to increase ROM, strength, coordination, balance, and proprioception in order to improve patient's ability to progress to PLOF and address remaining functional goals.   (see flow sheet as applicable)     Details if applicable:     28 32 [facility] Totals Reminder: bill using total billable min of TIMED therapeutic procedures (example: do not include dry needle or estim unattended, both untimed codes, in totals to left)  8-22 min = 1 unit; 23-37 min = 2 units; 38-52 min = 3 units; 53-67 min = 4 units; 68-82 min = 5 units   Total Total     [x]  Patient Education billed concurrently with other procedures   [x] Review HEP    [] Progressed/Changed HEP,

## 2023-08-08 ENCOUNTER — HOSPITAL ENCOUNTER (OUTPATIENT)
Facility: HOSPITAL | Age: 63
Setting detail: RECURRING SERIES
Discharge: HOME | End: 2023-08-11
Payer: MEDICARE

## 2023-08-08 PROCEDURE — 97530 THERAPEUTIC ACTIVITIES: CPT

## 2023-08-08 PROCEDURE — 97112 NEUROMUSCULAR REEDUCATION: CPT

## 2023-08-08 PROCEDURE — 97110 THERAPEUTIC EXERCISES: CPT

## 2023-08-08 NOTE — PROGRESS NOTES
PHYSICAL  DAILY TREATMENT NOTE     Patient Name: Sylvia Guest    Date: 2023    : 1960  Insurance: Payor: Saadia Mitchell / Plan: Jennifer Mascorro COMPLETE / Product Type: *No Product type* /      Patient  verified Yes     Visit #   Current / Total 25 28   Time   In / Out 10:37 11:29   Pain   In / Out 0/10 010   Subjective Functional Status/Changes: Pt feels good this morning and has no c/o p!.    Pt declined Estim initially but after treatment, pt requested Estim and heat to help relieve lower back discomfort. Pt is starting school in 6 days and ready to wrap up with therapy next visit. TREATMENT AREA =  Low back pain [M54.50]  Pain in left shoulder [M25.512]  Pain in left ankle and joints of left foot [M25.572]    OBJECTIVE    Modalities Rationale:     decrease pain to improve patient's ability to  progress to PLOF and address remaining functional goals. 10 min [x] Estim, type/location: Biphasic to L/s in supine with wedge under LEs                                             []  att     [x]  unatt     []  w/US     []  w/ice    [x]  w/heat   [x]Skin assessment post-treatment (if applicable):   [x]  intact    []  redness- no adverse reaction                  []redness - adverse reaction:    e reaction:        Therapeutic Procedures: Tx Min Billable or 1:1 Min (if diff from Tx Min) Procedure, Rationale, Specifics   12  04671 Therapeutic Exercise (timed):  increase ROM, strength, coordination, balance, and proprioception to improve patient's ability to progress to PLOF and address remaining functional goals. (see flow sheet as applicable)     Details if applicable:       20  [id number] Therapeutic Activity (timed):  use of dynamic activities replicating functional movements to increase ROM, strength, coordination, balance, and proprioception in order to improve patient's ability to progress to PLOF and address remaining functional goals.   (see flow sheet as applicable)     Details if

## 2023-08-11 ENCOUNTER — HOSPITAL ENCOUNTER (OUTPATIENT)
Facility: HOSPITAL | Age: 63
Setting detail: RECURRING SERIES
Discharge: HOME | End: 2023-08-14
Payer: MEDICARE

## 2023-08-11 PROCEDURE — 97110 THERAPEUTIC EXERCISES: CPT

## 2023-08-11 NOTE — DISCHARGE SUMMARY
Physical Therapy Discharge Instructions      [facility] - [address]  [phone] fax      Patient: Jazmin Harry  : 1960      Continue Home Exercise Program as indicated on HEP paperwork    Continue with    [] Ice  as needed      [x] Heat           Follow up with MD:     [] Upon completion of therapy     [x] As needed      Recommendations:     [x]   Return to activity with home program    []   Return to activity with the following modifications:       []Post Rehab Program    []Join Independent aquatic program     []Return to/join local gym      Raquel Resendez, PT 2023 10:57 AM

## 2023-08-11 NOTE — PROGRESS NOTES
PHYSICAL / OCCUPATIONAL THERAPY - DAILY TREATMENT NOTE (updated )    Patient Name: Cory Frederick    Date: 2023    : 1960  Insurance: Payor: Montana Berman / Plan: Bhumika Has / Product Type: *No Product type* /      Patient  verified Yes     Visit #   Current / Total 26 28   Time   In / Out 10:47 11:00   Pain   In / Out 1 1   Subjective Functional Status/Changes: Pt reports 1/10 LBP. Pt reports that she can stand/walk up to 15 minutes without increased pain, reports that she has to sit down due to pain at that point. Pt reports that pain gets up to a 4/10 and if she stands/walks for longer than 15 minutes, pain exceeds 4/10. Pt requests to keep session as short as possible due to having other obligations today. Next PN/ RC due NA - DC  Auth due NA - DC    TREATMENT AREA =  Low back pain [M54.50]  Pain in left shoulder [M25.512]  Pain in left ankle and joints of left foot [M25.572]    OBJECTIVE    Therapeutic Procedures: Tx Min Billable or 1:1 Min (if diff from Tx Min) Procedure, Rationale, Specifics   13  16601 Therapeutic Exercise (timed):  increase ROM, strength, coordination, balance, and proprioception to improve patient's ability to progress to PLOF and address remaining functional goals.  (see flow sheet as applicable)     Details if applicable:  HEP review, ROM/MMT                         15  MC BC Totals Reminder: bill using total billable min of TIMED therapeutic procedures (example: do not include dry needle or estim unattended, both untimed codes, in totals to left)  8-22 min = 1 unit; 23-37 min = 2 units; 38-52 min = 3 units; 53-67 min = 4 units; 68-82 min = 5 units   Total Total     [x]  Patient Education billed concurrently with other procedures   [x] Review HEP    [x] Progressed/Changed HEP, detail: Issued updated HEP (copy in chart)   [x] Other detail:  Issued DC instructions (copy in chart)     Objective Information/Functional Measures/Assessment    HEP

## 2023-08-11 NOTE — THERAPY DISCHARGE
[address] [facility] [facility]  [address] [facility] [address] [place]  Phone: [phone] Fax [phone]  DISCHARGE SUMMARY  Patient Name: Doug Roque : 1960   Treatment/Medical Diagnosis: M25.572  LEFT ANKLE PAIN , M25.512  LEFT SHOULDER PAIN, and M54.59  OTHER LOWER BACK PAIN   Referral Source: Kianna Acevedo MD     Date of Initial Visit: 3/28/2023 Attended Visits: 26 Missed Visits: 4     SUMMARY OF TREATMENT  Treatment consisted of initial evaluation and 25 treatment sessions, which included ROM/stretching/strengthening exercises, functional mobility/gait training, modalities for pain relief and patient education (including HEP). CURRENT STATUS  Patient has progressed with exercises in clinic and demonstrates compliance with HEP. Patient reports lower back pain, which she rates 1/10 this session. Patient reports that she has to sit down after 15 minutes standing/walking due to lower back pain increasing to >4/10, reports that pain will become >4/10 if she does not sit down. Shoulder ROM/strength have improved (see below). Strength (MMT):  Shoulder L (1-5) R (1-5)   Shoulder Flexion 5  5   Shoulder ABD 5  5   Shoulder IR 5  5   Shoulder ER 5  5      Left shoulder flexion AROM: 130 degrees without pain, right shoulder flexion AROM: 140 degrees  Left shoulder abd AROM: 120 degrees without pain, right shoulder abd AROM: 120 degrees     Goal/Measure of Progress Goal Met? 1. Patient will demonstrate left shoulder flexion strength of at least 4/5 to improve overhead lifting. Status at last Eval: 3+/5 Current Status: 5/5 yes   2. Patient will demonstrate greater than or equal to 130 degrees left shoulder abduction AROM to facilitate lateral reach   Status at last Eval: 121 deg Current Status: 120 deg no   3.   Patient will demo greater than or equal to 140 degrees left shoulder flexion AROM to facilitate reaching [place] cabinets   Status at last

## 2023-08-15 ENCOUNTER — APPOINTMENT (OUTPATIENT)
Facility: HOSPITAL | Age: 63
End: 2023-08-15
Payer: MEDICARE

## 2023-08-18 ENCOUNTER — APPOINTMENT (OUTPATIENT)
Facility: HOSPITAL | Age: 63
End: 2023-08-18
Payer: MEDICARE

## 2023-08-22 ENCOUNTER — APPOINTMENT (OUTPATIENT)
Facility: HOSPITAL | Age: 63
End: 2023-08-22
Payer: MEDICARE

## 2023-08-25 ENCOUNTER — APPOINTMENT (OUTPATIENT)
Facility: HOSPITAL | Age: 63
End: 2023-08-25
Payer: MEDICARE

## 2023-08-29 ENCOUNTER — APPOINTMENT (OUTPATIENT)
Facility: HOSPITAL | Age: 63
End: 2023-08-29
Payer: MEDICARE

## 2023-08-31 ENCOUNTER — APPOINTMENT (OUTPATIENT)
Facility: HOSPITAL | Age: 63
End: 2023-08-31
Payer: MEDICARE

## 2024-03-21 ENCOUNTER — TELEPHONE (OUTPATIENT)
Dept: PHARMACY | Facility: CLINIC | Age: 64
End: 2024-03-21

## 2024-03-21 NOTE — TELEPHONE ENCOUNTER
POPULATION [facility]: ADHERENCE REVIEW  Identified care gap per [employer] fills with [facility]: Diabetes adherence    Medicare and senior care Dual Special Needs Plan - MRDSNP  Per insurer report, LIS-2 - may be able to receive up to a 100-day supply for the same cost as a 30-day supply    ASSESSMENT    DIABETES ADHERENCE    Insurance Records claims through  03.11.24  (Prior Year PDC = not reported; YTD PDC = FIRST FILL; Potential Fail Date: 05.17.24):   OZEMPIC INJ 2MG/3 ML last filled on 01.29.24 for 42 day supply. Next refill due: 03.11.24    Prescribed sig:  INJECT 0.25 MG ONCE WEEKLY FOR 4 WEEKS. AFTER THAT, INJECT 0.5 MG ONCE WEEKLY.    Per Insurer Portal: last filled on 01.29.24 for 42 day supply.     Per [employer]  [facility]acy: last picked up on 01.29.24 for 42 day supply. Billed through [employer]. 1 refills remaining of 2 boxes.    No results found for: \"LABA1C\"      PLAN  The following are interventions that have been identified:   Patient overdue refilling OZEMPIC INJ 2MG/3 ML and active on home medication list.   Patient overdue refilling OZEMPIC INJ 2MG/3 ML and prescribed by an outside/ non [facility] Provider SHARMIN MUNOZ .  Outreach:  Patient:  Letter sent to patient. Our records show OZEMPIC INJ 2MG/3 ML was last filled 01.29.24 and was due 03.11.24. According to [facility], OZEMPIC INJ 2MG/3 ML is still on back-order. You may want to reach out to your provider and see if she wants to prescribe an alternate therapy until this medication is no longer on back-order.    Last Visit: none  Next Visit: none    Kylee Leo CPhT  Population Health    [facility]   [phone] option 2      For Pharmacy Admin Tracking Only    Program: [facility]  CPA in place:  No  Gap Closed?: No   Time Spent (min): 20